# Patient Record
Sex: FEMALE | Race: WHITE | Employment: OTHER | URBAN - METROPOLITAN AREA
[De-identification: names, ages, dates, MRNs, and addresses within clinical notes are randomized per-mention and may not be internally consistent; named-entity substitution may affect disease eponyms.]

---

## 2022-11-17 ENCOUNTER — HOSPITAL ENCOUNTER (OUTPATIENT)
Dept: PHYSICAL THERAPY | Age: 22
Discharge: HOME | End: 2022-11-17
Payer: OTHER GOVERNMENT

## 2022-11-17 PROCEDURE — 97112 NEUROMUSCULAR REEDUCATION: CPT

## 2022-11-17 PROCEDURE — 97162 PT EVAL MOD COMPLEX 30 MIN: CPT

## 2022-11-17 PROCEDURE — 97535 SELF CARE MNGMENT TRAINING: CPT

## 2022-11-17 NOTE — PROGRESS NOTES
Physical Therapy Evaluation        Patient Name: Carol Mojica  Date:2022  : 2000  [x]  Patient  Verified  Payor: MICHAEL / Plan: Deepak Colon 73 / Product Type:  /    In time:11:15  Out time:12:08  Total Treatment Time (min): 53  Visit #: 1 of 12    Medicare/BCBS Only   Total Timed Codes (min):  35 1:1 Treatment Time:  53       Treatment Area: Other low back pain [M54.59]    SUBJECTIVE  ny medication changes, allergies to medications, adverse drug reactions, diagnosis change, or new procedure performed?: [x] No    [] Yes (see summary sheet for update)  Subjective functional status/changes:     Current symptoms/Complaints: SIJ/LBP, hip and thigh pain Right>Left  Mechanism of Injury: Pregnancy (patient is 24 weeks pregnant at [facility])    History of Present Complaint: Patient is 24 weeks pregnant. Patient reports symptoms began in September and are getting worse.     PLOF: functionally independent, no modifications, active lifestyle, works mainly while sitting but consistently up and down  Limitations to PLOF: pain at work- requires more frequency sitting breaks during jobs, taking Tylenol as needed, pain daily that worsens with sit<>stand transfers, pain is a distraction, pain with walking sometimes, pain with bending over to feed cats-bought automatic feeder    Pain Location: right hip to upper thigh, LBP and SIJ, sometimes in lft  Pain Level (0-10 scale):   []constant []intermittent []improving [x]worsening []no change since onset  Current: 4/10  Best: 2-3/10 during rest, first thing in the morning  Worst: 9/10 during nighttime, trying to fall asleep  Sleep: is interrupted secondary to pain    Previous Treatment/Compliance: Tylenol PRN, KT tape for support, ordered maternity support band  Obstetrical History:First pregnancy, no complications  PMHx/Surgical Hx: None relevant  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 [] Yes    [x] No    Bowel function: normal, no problems    Diet: Good    Fluid intake:    Fluid  Amount per day   Water 64 ounces   Caffeine 1-2 soda    Alcohol none             Physical Exam Objective Findings:    Gait:  [] Normal     [x] Abnormal: decreased hip and knee flexion, stiff through trunk, increased lateral sway, \"robotic\"     Postural assessment:  Seated: biased into APT  Pelvic girdle alignment:    Innominate upslip on left    Lumbar Active Movements: Deferred due to pregnancy    Hip ROM: WNL, hip pain and end range IR>ER, Righ>left   Flexion past 90 degrees provokes SIJ symptoms    Strength   L(0-5) R (0-5) N/T   Hip Flexion (L1,2) NT due to pain with SLR  NT due to pain with SLR []   Abdominals  Lower abdominals  Rectus Able to contractTA  []   Gluteus Khris Able to lift into bridge 50% range with pain  []   Hip ER- seated 4 pain 3+ pain []   Hip IR- seated 4 pain 3+ pain []       Special Tests  SLS:  Right: 10 seconds           Left: 10 seconds, pain    ASLR: Active Straight Leg Raise Test   Right: Effortful to raise leg> left, Less pain with external stabilization, + test  Left:  Less pain with external stabilization, + test      [place]:  Stork test:  [] R    [] L    [] +    [x] -     Hip:  Anuel:  [x] R    [x] L    [x] +    [] -     FADIR:  [] R    [] L    [] +    [x] -     Scours: negative    18 min [x]Eval                  []Re-Eval       25 min Self Care: Reviewed expectations for orthopedic SIJ/lumbar spine/ hip PT vs. PFPT and POC.    Re-positioned upslip in left innominate with education on symmetrical postures and transfers to prevent further irritation. Education on pregnancy, hormones and effect on body joints, expectations for pregnancy progression, support belt usage. Rationale:  Increase awareness and understanding of current condition to improve patients ability to independently and effectively attain goals and progress towards long term management of current condition. 10 min Neuro re-ed:  [] See flow sheet : Exercises for SIJ pain/ stability and improving gait mechanics   Rationale: increase strength, improve coordination, and increase proprioception to improve the patients ability to perform ADLs with less pain and restore PLOF          With   [] TE   [] TA   [] neuro   [] other: Patient Education: [x] Review HEP    [] Progressed/Changed HEP based on:   [] positioning   [] body mechanics   [] transfers   [] heat/ice application    [] other:           Pain Level (0-10 scale) post treatment: 4/10    ASSESSMENT/Changes in Function: Patient is a 20year old female who is 24 weeks pregnant with first child presenting to Physical Therapy with c/o SIJ/ lumbar/ hip pain on right>left due to progressing pregnancy which is limiting ability function at previous level. Patient's symptoms began in September and are progressively worsening. Her symptoms limit her functioning including work, transfers, and sleep. Of note, patient presented with left innominate upslip, positive ASLR, and pain/weakness/ pain provocation of hips indicating SIJ instability. Patient presents with decreased strength, coordination, and endurance of muscles and decreased strength of postural stabilizers. Patient will benefit from skilled physical therapy intervention to address pain and deficits with focus on external SIJ/ lumbar spine/ hip orthopedics, with internal Pelvic floor muscle assessment as needed to optimize highest functional level possible.      Patient will continue to benefit from skilled PT services to modify and progress therapeutic interventions, address functional mobility deficits, address ROM deficits, address strength deficits, analyze and address soft tissue restrictions, analyze and cue movement patterns, analyze and modify body mechanics/ergonomics, assess and modify postural abnormalities, address imbalance/dizziness, and instruct in home and community integration to attain remaining goals. [x]  See Plan of Care  []  See progress note/recertification  []  See Discharge Summary         Progress towards goals / Updated goals:  Short Term Goals: To be accomplished in 3 weeks :  Patient will be independent and compliant with HEP to progress toward goals and restore functional mobility. Eval Status: issued at eval    Patient will demonstrate consistent innominate symmetry for less pain during transfers at work. Eval Status: Innominate upslip on left    Patient will improve pain in hip, lumbar spine, and SIJ to some instances of 0/10  to improve walking tolerance and restore prior level of function. Eval Status: Best: 2-3/10 during rest, first thing in the morning    Long Term Goals: To be accomplished in 6 weeks:  Patient will improve FOTO score by 23 points (to 74 points) to improve functional tolerance for working tolerance. Eval Status: FOTO 51  FOTO score = an established functional score where 100 = no disability    2. Pt will have painfree hip AROM to aid in functional mechanics for ambulation/ADLs. Eval Status: Hip ROM: WNL, hip pain and end range IR>ER, Righ>left; Flexion past 90 degrees provokes SIJ symptoms    3. Pt will have 4+/5 hip ER/IR strength to return to goals of exercising through pregnancy without pain. Eval Status:    L(0-5) R (0-5)   Hip ER- seated 4 pain 3+ pain   Hip IR- seated 4 pain 3+ pain       4. Patient will improve pain in hip, lumbar spine, and SIJ  to 2-3/10 at worst to improve work tolerance and quality and restore prior level of function. Eval Status:  Worst: 9/10 during nighttime, trying to fall asleep/10 at worst    5. Pt will have negative and symmetrical ASLR to improve ability to bend over for ADLs. Eval Status: ASLR: Active Straight Leg Raise Test   Right: Effortful to raise leg> left, Less pain with external stabilization, + test  Left:  Less pain with external stabilization, + test    6. Patient will have appropriate knowledge of pelvic floor, TA, and urinary/bowel/pelvic pain symptoms for appropriate self care pre-natally and post-natally for independent management and referral if necessary.    Eval status: patient unaware of normal functioning of pelvic floor, TA, and possibly symptoms on genitourinary systems pre- and post-natally    PLAN  []  Upgrade activities as tolerated     [x]  Continue plan of care  []  Update interventions per flow sheet       []  Discharge due to:_  []  Other:_      Carlos Segura, PT 11/17/2022  9:37 AM       .Pankaj Smiley

## 2022-11-17 NOTE — PROGRESS NOTES
[facility] at the [address], [place], [address] [facility]  Phone: [phone]      Fax:  [phone]      Plan of Care/ Statement of Necessity for Physical Therapy Services      Patient name: Cassandra Copeland Start of Care: 2022   Referral source: Susan Yadav MD : 2000    Medical Diagnosis: Other low back pain [M54.59]   Onset Date:22    Treatment Diagnosis: Other low back pain [M54.59]   Prior Hospitalization: see medical history Provider#: [id number]   Medications: Verified on Patient summary List    Comorbidities: 24 weeks pregnant with first child at [facility]   Prior Level of Function: functionally independent, no modifications, active lifestyle, works mainly while sitting but consistently up and down      The Plan of Care and following information is based on the information from the initial evaluation. Assessment/ key information: Patient is a 20year old female who is 24 weeks pregnant with first child presenting to Physical Therapy with c/o SIJ/ lumbar/ hip pain on right>left due to progressing pregnancy which is limiting ability function at previous level. Patient's symptoms began in September and are progressively worsening. Her symptoms limit her functioning including work, transfers, and sleep. Of note, patient presented with left innominate upslip, positive ASLR, and pain/weakness/ pain provocation of hips indicating SIJ instability. Patient presents with decreased strength, coordination, and endurance of muscles and decreased strength of postural stabilizers. Patient will benefit from skilled physical therapy intervention to address pain and deficits with focus on external SIJ/ lumbar spine/ hip orthopedics, with internal Pelvic floor muscle assessment as needed to optimize highest functional level possible.       Evaluation Complexity History MEDIUM  Complexity : 1-2 comorbidities / personal factors will impact the outcome/ POC ; Examination MEDIUM Complexity : 3 Standardized tests and measures addressing body structure, function, activity limitation and / or participation in recreation  ;Presentation MEDIUM Complexity : Evolving with changing characteristics  ; Clinical Decision Making MEDIUM Complexity : FOTO score of 26-74 FOTO score = an established functional score where 100 = no disability  Overall Complexity Rating: MEDIUM  Problem List: pain affecting function, decrease strength, impaired gait/ balance, decrease ADL/ functional abilitiies, decrease activity tolerance, decrease flexibility/ joint mobility, decrease transfer abilities, and other unknowledgeable of effect of pregnancy on body    Treatment Plan may include any combination of the following: Therapeutic exercise, Neuromuscular reeducation, Manual therapy, Therapeutic activity, Self care/home management, Gait training, and Other: Pelvic Floor Physical Therapy    Patient / Family readiness to learn indicated by: asking questions and interest  Persons(s) to be included in education: patient (P)  Barriers to Learning/Limitations: None  Patient Goal (s): To help relieve the pain and learn new exercises throughout pregnancy  Patient Self Reported Health Status: good  Rehabilitation Potential: good    Short Term Goals: To be accomplished in 3 weeks :  Patient will be independent and compliant with HEP to progress toward goals and restore functional mobility. Eval Status: issued at eval     Patient will demonstrate consistent innominate symmetry for less pain during transfers at work. Eval Status: Innominate upslip on left     Patient will improve pain in hip, lumbar spine, and SIJ to some instances of 0/10  to improve walking tolerance and restore prior level of function. Eval Status: Best: 2-3/10 during rest, first thing in the morning     Long Term Goals:  To be accomplished in 6 weeks:  Patient will improve FOTO score by 23 points (to 74 points) to improve functional tolerance for working tolerance. Eval Status: FOTO 51  FOTO score = an established functional score where 100 = no disability     2. Pt will have painfree hip AROM to aid in functional mechanics for ambulation/ADLs. Eval Status: Hip ROM: WNL, hip pain and end range IR>ER, Righ>left; Flexion past 90 degrees provokes SIJ symptoms     3. Pt will have 4+/5 hip ER/IR strength to return to goals of exercising through pregnancy without pain. Eval Status:     L(0-5) R (0-5)   Hip ER- seated 4 pain 3+ pain   Hip IR- seated 4 pain 3+ pain         4. Patient will improve pain in hip, lumbar spine, and SIJ  to 2-3/10 at worst to improve work tolerance and quality and restore prior level of function. Eval Status: Worst: 9/10 during nighttime, trying to fall asleep/10 at worst     5. Pt will have negative and symmetrical ASLR to improve ability to bend over for ADLs. Eval Status: ASLR: Active Straight Leg Raise Test   Right: Effortful to raise leg> left, Less pain with external stabilization, + test  Left:  Less pain with external stabilization, + test     6. Patient will have appropriate knowledge of pelvic floor, TA, and urinary/bowel/pelvic pain symptoms for appropriate self care pre-natally and post-natally for independent management and referral if necessary. Eval status: patient unaware of normal functioning of pelvic floor, TA, and possibly symptoms on genitourinary systems pre- and post-natally    Frequency / Duration: Patient to be seen 2 times per week for 6 weeks. Patient/ Caregiver education and instruction: Diagnosis, prognosis, self care, activity modification, and exercises   [x]  Plan of care has been reviewed with JAVED Valladares, PT 11/17/2022 9:37 AM  _____________________________________________________________________  I certify that the above Therapy Services are being furnished while the patient is under my care.  I agree with the treatment plan and certify that this therapy is necessary.     Physician's Signature:____________Date:_________TIME:________                                      Ruslan Delgado MD    ** Signature, Date and Time must be completed for valid certification **    Please sign and return to [facility] at the [address], [address] [facility]       Phone: [phone]      Fax:  [phone]

## 2022-11-29 ENCOUNTER — HOSPITAL ENCOUNTER (OUTPATIENT)
Dept: PHYSICAL THERAPY | Age: 22
Discharge: HOME | End: 2022-11-29
Payer: OTHER GOVERNMENT

## 2022-11-29 PROCEDURE — 97110 THERAPEUTIC EXERCISES: CPT

## 2022-11-29 PROCEDURE — 97535 SELF CARE MNGMENT TRAINING: CPT

## 2022-11-29 PROCEDURE — 97112 NEUROMUSCULAR REEDUCATION: CPT

## 2022-11-29 PROCEDURE — 97530 THERAPEUTIC ACTIVITIES: CPT

## 2022-12-09 ENCOUNTER — HOSPITAL ENCOUNTER (OUTPATIENT)
Dept: PHYSICAL THERAPY | Age: 22
Discharge: HOME | End: 2022-12-09
Payer: OTHER GOVERNMENT

## 2022-12-09 PROCEDURE — 97530 THERAPEUTIC ACTIVITIES: CPT

## 2022-12-09 PROCEDURE — 97110 THERAPEUTIC EXERCISES: CPT

## 2022-12-09 PROCEDURE — 97112 NEUROMUSCULAR REEDUCATION: CPT

## 2022-12-09 PROCEDURE — 97535 SELF CARE MNGMENT TRAINING: CPT

## 2022-12-09 NOTE — PROGRESS NOTES
PT DAILY TREATMENT NOTE    Patient Name: Malena Bergeron  Date:2022  : 2000  [x]  Patient  Verified  Payor:  / Plan: Kunal Presser / Product Type:  /    In time:12:04  Out time:12:48am  Total Treatment Time (min): 44  Total Timed Codes (min): 44  1:1 Treatment Time (MC/BCBS only): NA   Visit #: 3 of 12    Treatment Dx: Other low back pain [M54.59]    SUBJECTIVE  Pain Level (0-10 scale): 1  Any medication changes, allergies to medications, adverse drug reactions, diagnosis change, or new procedure performed?: [x] No    [] Yes (see summary sheet for update)  Subjective functional status/changes:   [] No changes reported  Pt reports overall improvements and good HEP compliance. Still has trouble tolerating sleeping in sidelying, especially right side, but maternity pillow helps. Still having intermittent radicular sxs in right posterior leg.      OBJECTIVE       10 min Therapeutic Exercise:  [x] See flow sheet :   Rationale: increase ROM, increase strength, improve coordination, improve balance, and increase proprioception to improve the patients ability to perform daily activities with decreased pain and symptom levels        8 min Therapeutic Activity:  [x]  See flow sheet : verbal review of HEP and discussion of current sxs/complaints   Rationale: increase ROM, increase strength, improve coordination, improve balance, and increase proprioception  to improve the patients ability to perform daily activities with decreased pain and symptom levels        18 min Neuromuscular Re-education:  [x]  See flow sheet : pelvic mobility and hip shifting with tactile, verbal, and visual cueing to reduce compensation at thoracic spine    Rationale: increase ROM, increase strength, improve coordination, improve balance, and increase proprioception  to improve the patients ability to perform daily activities with decreased pain and symptom levels      8 min Self Care:  Edu re: activity modification, chair positioning, chair transfer mechanics, sleep positioning   Rationale:to increase awareness and understanding of current condition to improve patients ability to independently and effectively attain goals and progress towards long term management of current condition. With   [] TE   [] TA   [] neuro   [] other: Patient Education: [x] Review HEP    [] Progressed/Changed HEP based on:   [] positioning   [] body mechanics   [] transfers   [] heat/ice application    [] other:      Other Objective/Functional Measures:    Squat mechanics:   Pre-tx: moderate lateral hip shift to left  Post-tx: minimal lateral hip shift to left     Pain Level (0-10 scale) post treatment: 0    ASSESSMENT/Changes in Function: Observed lateral hip shift to left side with DL squatting, indicating decreased functional right hip flexion and IR; symmetry improved following hip shifting and pelvic mobility activities today and pt voiced reduction in right posterior hip/SIJ pain by end of session. She benefits from tactile cueing at thoracic and lumbar spine to reduce thoracic kyphosis and lumbar lordosis with most activities. Patient will continue to benefit from skilled PT services to modify and progress therapeutic interventions, address functional mobility deficits, address ROM deficits, address strength deficits, analyze and address soft tissue restrictions, analyze and cue movement patterns, analyze and modify body mechanics/ergonomics, assess and modify postural abnormalities, address imbalance/dizziness, and instruct in home and community integration to attain remaining goals. [x]  See Plan of Care  []  See progress note/recertification  []  See Discharge Summary         Progress towards goals / Updated goals:  Short Term Goals: To be accomplished in 3 weeks :  Patient will be independent and compliant with HEP to progress toward goals and restore functional mobility.    Eval Status: issued at eval  Current 11/29/22: patient reports complinace     Patient will demonstrate consistent innominate symmetry for less pain during transfers at work. Eval Status: Innominate upslip on left  Current: 11/29/22: patient demonstrates innominate symmetry today  Status 12/9/22: progressed activities to improve pelvic mobility and symmetry with hip shifting and pelvic mobility exercises     Patient will improve pain in hip, lumbar spine, and SIJ to some instances of 0/10  to improve walking tolerance and restore prior level of function. Eval Status: Best: 2-3/10 during rest, first thing in the morning     Long Term Goals: To be accomplished in 6 weeks:  Patient will improve FOTO score by 23 points (to 74 points) to improve functional tolerance for working tolerance. Eval Status: FOTO 51  FOTO score = an established functional score where 100 = no disability     2. Pt will have painfree hip AROM to aid in functional mechanics for ambulation/ADLs. Eval Status: Hip ROM: WNL, hip pain and end range IR>ER, Righ>left; Flexion past 90 degrees provokes SIJ symptoms     3. Pt will have 4+/5 hip ER/IR strength to return to goals of exercising through pregnancy without pain. Eval Status:     L(0-5) R (0-5)   Hip ER- seated 4 pain 3+ pain   Hip IR- seated 4 pain 3+ pain         4. Patient will improve pain in hip, lumbar spine, and SIJ  to 2-3/10 at worst to improve work tolerance and quality and restore prior level of function. Eval Status: Worst: 9/10 during nighttime, trying to fall asleep/10 at worst  Status 12/9/22: pt reports improvements in pain intensity and overall functional limitation d/t sxs but cont to have difficulty tolerating sidelying sleep positioning      5. Pt will have negative and symmetrical ASLR to improve ability to bend over for ADLs.   Eval Status: ASLR: Active Straight Leg Raise Test   Right: Effortful to raise leg> left, Less pain with external stabilization, + test  Left:  Less pain with external stabilization, + test     6. Patient will have appropriate knowledge of pelvic floor, TA, and urinary/bowel/pelvic pain symptoms for appropriate self care pre-natally and post-natally for independent management and referral if necessary.               Eval status: patient unaware of normal functioning of pelvic floor, TA, and possibly symptoms on genitourinary systems pre- and post-natally      PLAN  []  Upgrade activities as tolerated     [x]  Continue plan of care  []  Update interventions per flow sheet       []  Discharge due to:_  []  Other:_      [facility], PT 12/9/2022  12:04 PM    Future Appointments   Date Time Provider [place]   12/13/2022 10:30 AM YUN Jaquez [facility]   12/15/2022 10:30 AM YUN Jaquez [facility]   12/22/2022 10:30 AM YUN Jaquez [facility]   12/29/2022 10:30 AM YUN Jaquez [facility]

## 2022-12-13 ENCOUNTER — HOSPITAL ENCOUNTER (OUTPATIENT)
Dept: PHYSICAL THERAPY | Age: 22
Discharge: HOME | End: 2022-12-13
Payer: OTHER GOVERNMENT

## 2022-12-13 PROCEDURE — 97530 THERAPEUTIC ACTIVITIES: CPT

## 2022-12-13 PROCEDURE — 97535 SELF CARE MNGMENT TRAINING: CPT

## 2022-12-13 PROCEDURE — 97112 NEUROMUSCULAR REEDUCATION: CPT

## 2022-12-13 PROCEDURE — 97110 THERAPEUTIC EXERCISES: CPT

## 2022-12-13 NOTE — PROGRESS NOTES
PT DAILY TREATMENT NOTE    Patient Name: Carol Mojica  Date:2022  : 2000  [x]  Patient  Verified  Payor:  / Plan: Deepak Colon 74 / Product Type:  /    In time:10:33  Out time:11:15  Total Treatment Time (min): 42  Total Timed Codes (min): 42  1:1 Treatment Time (MC/BCBS only): 42   Visit #: 4 of 12    Treatment Dx: Other low back pain [M54.59]    SUBJECTIVE  Pain Level (0-10 scale): 5  Any medication changes, allergies to medications, adverse drug reactions, diagnosis change, or new procedure performed?: [x] No    [] Yes (see summary sheet for update)  Subjective functional status/changes:   [] No changes reported    Patient reports her HR is increased a little from her norm. Shoulder hurt for a few days but improved. Her right SIJ pain is increasing. Started chiropractics last week with good result.      OBJECTIVE        10 min Therapeutic Exercise:  [] See flow sheet :   Rationale:improve coordination and increase proprioception  to improve the patients ability to perform all ADLs with good body mechanics    10 min Therapeutic Activity:  []  See flow sheet : Pelvic position sitting and standing, performing steps and activities on asymmetrical surfaces   Rationale: improve coordination and increase proprioception  to improve the patients ability to perform all ADLs with good body mechanics        14 min Neuromuscular Re-education:  []  See flow sheet :   Rationale:Rationale: increase strength, improve coordination, and increase proprioception  to improve the patients ability to work activities    8 min Self Care: Symmetry through positions at work and gait to decrease SIJ rotation, innominate correction and self management   Rationale:    improve coordination, increase proprioception, and awareness  to improve the patients ability to participate in all ADLs and 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 Pain improved following manual correction. Completed exercises with cues for symmetry and lumbopelvic stability as able. Continues to demonstrate increased mobility and favoring of left hip/SIJ. Evidence of right hip weakness/muscle inhibition functionally. Added functional pelvic stability training with eccentric step down with improved technique to manage symptoms with VC and TC. Patient will continue to benefit from skilled PT services to modify and progress therapeutic interventions, address functional mobility deficits, address ROM deficits, address strength deficits, analyze and address soft tissue restrictions, analyze and cue movement patterns, analyze and modify body mechanics/ergonomics, assess and modify postural abnormalities, address imbalance/dizziness, and instruct in home and community integration to attain remaining goals. [x]  See Plan of Care  []  See progress note/recertification  []  See Discharge Summary         Progress towards goals / Updated goals:  Short Term Goals: To be accomplished in 3 weeks :  Patient will be independent and compliant with HEP to progress toward goals and restore functional mobility. Eval Status: issued at eval  Current 11/29/22: patient reports complinace     Patient will demonstrate consistent innominate symmetry for less pain during transfers at work.    Eval Status: Innominate upslip on left  Current: 11/29/22: patient demonstrates innominate symmetry today  Status 12/13/22: added eccetric step downs for pelvic symmetry training     Patient will improve pain in hip, lumbar spine, and SIJ to some instances of 0/10  to improve walking tolerance and restore prior level of function. Eval Status: Best: 2-3/10 during rest, first thing in the morning     Long Term Goals: To be accomplished in 6 weeks:  Patient will improve FOTO score by 23 points (to 74 points) to improve functional tolerance for working tolerance. Eval Status: FOTO 51  FOTO score = an established functional score where 100 = no disability     2. Pt will have painfree hip AROM to aid in functional mechanics for ambulation/ADLs. Eval Status: Hip ROM: WNL, hip pain and end range IR>ER, Righ>left; Flexion past 90 degrees provokes SIJ symptoms     3. Pt will have 4+/5 hip ER/IR strength to return to goals of exercising through pregnancy without pain. Eval Status:     L(0-5) R (0-5)   Hip ER- seated 4 pain 3+ pain   Hip IR- seated 4 pain 3+ pain         4. Patient will improve pain in hip, lumbar spine, and SIJ  to 2-3/10 at worst to improve work tolerance and quality and restore prior level of function. Eval Status: Worst: 9/10 during nighttime, trying to fall asleep/10 at worst  Status 12/9/22: pt reports improvements in pain intensity and overall functional limitation d/t sxs but cont to have difficulty tolerating sidelying sleep positioning      5. Pt will have negative and symmetrical ASLR to improve ability to bend over for ADLs. Eval Status: ASLR: Active Straight Leg Raise Test   Right: Effortful to raise leg> left, Less pain with external stabilization, + test  Left:  Less pain with external stabilization, + test     6. Patient will have appropriate knowledge of pelvic floor, TA, and urinary/bowel/pelvic pain symptoms for appropriate self care pre-natally and post-natally for independent management and referral if necessary.               Eval status: patient unaware of normal functioning of pelvic floor, TA, and possibly symptoms on genitourinary systems pre- and post-natally       PLAN  []  Upgrade activities as tolerated [x]  Continue plan of care  []  Update interventions per flow sheet       []  Discharge due to:_  []  Other:_      Dewey Avalos, PT 12/13/2022  8:06 AM    Future Appointments   Date Time Provider [place]o Deal   12/13/2022 10:30 AM YUN Anders [facility]   12/15/2022 10:30 AM YUN Anders [facility]   12/22/2022 10:30 AM YUN Anders [facility]   12/29/2022 10:30 AM YUN Anders [facility] Detail Level: Detailed General Sunscreen Counseling: I recommended a broad spectrum sunscreen with a SPF of 30 or higher.  I explained that SPF 30 sunscreens block approximately 97 percent of the sun's harmful rays.  Sunscreens should be applied at least 15 minutes prior to expected sun exposure and then every 2 hours after that as long as sun exposure continues. If swimming or exercising sunscreen should be reapplied every 45 minutes to an hour after getting wet or sweating.  One ounce, or the equivalent of a shot glass full of sunscreen, is adequate to protect the skin not covered by a bathing suit. I also recommended a lip balm with a sunscreen as well. Sun protective clothing can be used in lieu of sunscreen but must be worn the entire time you are exposed to the sun's rays.

## 2022-12-15 ENCOUNTER — HOSPITAL ENCOUNTER (OUTPATIENT)
Dept: PHYSICAL THERAPY | Age: 22
Discharge: HOME | End: 2022-12-15
Payer: OTHER GOVERNMENT

## 2022-12-15 PROCEDURE — 97535 SELF CARE MNGMENT TRAINING: CPT

## 2022-12-15 PROCEDURE — 97530 THERAPEUTIC ACTIVITIES: CPT

## 2022-12-15 PROCEDURE — 97112 NEUROMUSCULAR REEDUCATION: CPT

## 2022-12-15 PROCEDURE — 97110 THERAPEUTIC EXERCISES: CPT

## 2022-12-15 NOTE — PROGRESS NOTES
[facility] at the [address], [place], [address] [facility]  Phone: [phone]      Fax:  [phone]    Progress Note  Patient name: Femi Boggs Start of Care: 2022   Referral source: Trinity Villatoro MD : 2000               Medical Diagnosis: Other low back pain [M54.59]    Onset Date:22               Treatment Diagnosis: Other low back pain [M54.59]   Prior Hospitalization: see medical history Provider#: [id number]   Medications: Verified on Patient summary List    Comorbidities: 24 weeks pregnant with first child at [facility]   Prior Level of Function: functionally independent, no modifications, active lifestyle, works mainly while sitting but consistently up and down                              Visits from Start of Care: 5    Missed Visits: 0    Progress Towards Goals:  Patient is a 20year old female who is 28 weeks pregnant with first child presenting to Physical Therapy with c/o SIJ/ lumbar/ hip pain on right>left due to progressing pregnancy which is limiting ability function at previous level. Patient has completed 5 Physical Therapy visits. Patient reports significant decrease in pain frequency and duration, with decreased overall levels of pain. Her hip strength and ROM is improved since evaluation. Patient demonstrates improvement in body mechanics and awareness to manage her symptoms. Patient continues to demonstrate deficits in right hip strength and subsequently still requires SIJ corrections to alignment. She continues to have right hip and SIJ pain even though is it improving, warratngin . Patient is making progress towards goals. Patient will benefit from continued skilled Physical Therapy intervention to address remaining deficits and achieve goals to maximize function. Updated Goals: to be achieved in 6 weeks   Short Term Goals:  To be accomplished in 3 weeks :  Patient will be independent and compliant with HEP to progress toward goals and restore functional mobility. Eval Status: issued at eval  Current 12/15/22: patient reports compliance with HEP and application of body positioning      Patient will demonstrate consistent innominate symmetry for less pain during transfers at work. Eval Status: Innominate upslip on left  Current: 11/29/22: patient demonstrates innominate symmetry today  Status 12/15/22: patient presents in alignment today followin correction on 12/13/22. Patient educated and performing HEP to improve symmetry     Patient will improve pain in hip, lumbar spine, and SIJ to some instances of 0/10  to improve walking tolerance and restore prior level of function. Eval Status: Best: 2-3/10 during rest, first thing in the morning  Current: 12/15/22: 1-2/10 at best     Long Term Goals: To be accomplished in 6 weeks:  Patient will improve FOTO score by 23 points (to 74 points) to improve functional tolerance for working tolerance. Eval Status: FOTO 51  FOTO score = an established functional score where 100 = no disability  Current: 12/15/22: to be assessed at next visit due to time constraints     2. Pt will have painfree hip AROM to aid in functional mechanics for ambulation/ADLs. Eval Status: Hip ROM: WNL, hip pain and end range IR>ER, Righ>left; Flexion past 90 degrees provokes SIJ symptoms  Current: MET12/15/22: Patinet demonstrates full hip flexion ROM within allowance of pregrnancy and full hip IR/ER without discomfort. Reports no functional limitations     3. Pt will have 4+/5 hip ER/IR strength to return to goals of exercising through pregnancy without pain. Eval Status:     L(0-5) R (0-5)   Hip ER- seated 4 pain 3+ pain   Hip IR- seated 4 pain 3+ pain    Current: 12/15/22:        L(0-5) R (0-5)   Hip ER- seated 4+ 4-   Hip IR- seated 4+ 4-         4.    Patient will improve pain in hip, lumbar spine, and SIJ  to 2-3/10 at worst to improve work tolerance and quality and restore prior level of function. Eval Status: Worst: 9/10 during nighttime, trying to fall asleep/10 at worst  Status 12/15/22: 8/10 at worst at night, less frequent (only 1-2 times per week vs. 4-5 times at evaluation) and less duration     5. Pt will have negative and symmetrical ASLR to improve ability to bend over for ADLs. Eval Status: ASLR: Active Straight Leg Raise Test   Right: Effortful to raise leg> left, Less pain with external stabilization, + test  Left:  Less pain with external stabilization, + test  Current: MET 12/15/22: same bilaterally with and without external support      6. Patient will have appropriate knowledge of pelvic floor, TA, and urinary/bowel/pelvic pain symptoms for appropriate self care pre-natally and post-natally for independent management and referral if necessary. Eval status: patient unaware of normal functioning of pelvic floor, TA, and possibly symptoms on genitourinary systems pre- and post-natally              Current: 12/15/22: patient with increased awareness and improved body positioning application for symptom management       ASSESSMENT/RECOMMENDATIONS:  [x]Continue therapy per initial plan/protocol at a frequency of  2 x per week for 6 weeks    Thank you for this referral.   Zehra Durán, PT 12/15/2022 8:05 AM  NOTE TO PHYSICIAN:  Kay Mckoy [address]   FAX TO [facility] Physical Therapy: [phone]  If you are unable to process this request in 24 hours please contact our office: [phone]        []  I have read the above report and request that my patient continue as recommended. []  I have read the above report and request that my patient continue therapy with the following changes/special instructions:________________________________________  []I have read the above report and request that my patient be discharged from therapy.     Physician's Signature:____________Date:_________TIME:________                                      Jd Johnson MD      ** Signature, Date and Time must be completed for valid certification **

## 2022-12-15 NOTE — PROGRESS NOTES
PT DAILY TREATMENT NOTE    Patient Name: Nicol Lew  Date:12/15/2022  : 2000  [x]  Patient  Verified  Payor: MICHAEL / Plan: Deepak Colon 74 / Product Type:  /    In time:10:30  Out time:11:20  Total Treatment Time (min): 50  Total Timed Codes (min): 50  1:1 Treatment Time (MC/BCBS only): 50   Visit #: 5 of 12    Treatment Dx: Other low back pain [M54.59]    SUBJECTIVE  Pain Level (0-10 scale): 1-210  Any medication changes, allergies to medications, adverse drug reactions, diagnosis change, or new procedure performed?: [x] No    [] Yes (see summary sheet for update)  Subjective functional status/changes:   [] No changes reported    Patient reports low grade of discomfort in hip since previous session. Patient reports significant improvement and pain alleviation since beginning therapy.      OBJECTIVE  16 min Therapeutic Exercise:  [] See flow sheet :   Rationale:improve coordination and increase proprioception  to improve the patients ability to perform all ADLs with good body mechanics     10 min Therapeutic Activity:  []  See flow sheet : Pelvic position sitting and standing, performing steps and activities on asymmetrical surfaces   Rationale: improve coordination and increase proprioception  to improve the patients ability to perform all ADLs with good body mechanics        14 min Neuromuscular Re-education:  []  See flow sheet :   Rationale:Rationale: increase strength, improve coordination, and increase proprioception  to improve the patients ability to work activities     10 min Self Care: Education on exercises and travelling recommendations to manage symptoms during place ride to [place]   Rationale:    improve coordination, increase proprioception, and awareness  to improve the patients ability to participate in all ADLs and 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 Patient has completed 5 Physical Therapy visits. Patient reports significant decrease in pain frequency and duration, with decreased overall levels of pain. Her hip strength and ROM is improved since evaluation. Patient demonstrates improvement in body mechanics and awareness to manage her symptoms. Patient continues to demonstrate deficits in right hip strength and subsequently still requires SIJ corrections to alignment. She continues to have right hip and SIJ pain even though is it improving, warratngin . Patient is making progress towards goals. Patient will benefit from continued skilled Physical Therapy intervention to address remaining deficits and achieve goals to maximize function. Patient will continue to benefit from skilled PT services to modify and progress therapeutic interventions, address functional mobility deficits, address ROM deficits, address strength deficits, analyze and address soft tissue restrictions, analyze and cue movement patterns, analyze and modify body mechanics/ergonomics, assess and modify postural abnormalities, address imbalance/dizziness, and instruct in home and community integration to attain remaining goals. [x]  See Plan of Care  []  See progress note/recertification  []  See Discharge Summary         Progress towards goals / Updated goals:  Short Term Goals:  To be accomplished in 3 weeks :  Patient will be independent and compliant with HEP to progress toward goals and restore functional mobility. Eval Status: issued at eval  Current 12/15/22: patient reports compliance with HEP and application of body positioning      Patient will demonstrate consistent innominate symmetry for less pain during transfers at work. Eval Status: Innominate upslip on left  Current: 11/29/22: patient demonstrates innominate symmetry today  Status 12/15/22: patient presents in alignment today followin correction on 12/13/22. Patient educated and performing HEP to improve symmetry     Patient will improve pain in hip, lumbar spine, and SIJ to some instances of 0/10  to improve walking tolerance and restore prior level of function. Eval Status: Best: 2-3/10 during rest, first thing in the morning  Current: 12/15/22: 1-2/10 at best     Long Term Goals: To be accomplished in 6 weeks:  Patient will improve FOTO score by 23 points (to 74 points) to improve functional tolerance for working tolerance. Eval Status: FOTO 51  FOTO score = an established functional score where 100 = no disability  Current: 12/15/22: to be assessed at next visit due to time constraints     2. Pt will have painfree hip AROM to aid in functional mechanics for ambulation/ADLs. Eval Status: Hip ROM: WNL, hip pain and end range IR>ER, Righ>left; Flexion past 90 degrees provokes SIJ symptoms  Current: MET12/15/22: Patinet demonstrates full hip flexion ROM within allowance of pregrnancy and full hip IR/ER without discomfort. Reports no functional limitations     3. Pt will have 4+/5 hip ER/IR strength to return to goals of exercising through pregnancy without pain. Eval Status:     L(0-5) R (0-5)   Hip ER- seated 4 pain 3+ pain   Hip IR- seated 4 pain 3+ pain    Current: 12/15/22:       L(0-5) R (0-5)   Hip ER- seated 4+ 4-   Hip IR- seated 4+ 4-        4. Patient will improve pain in hip, lumbar spine, and SIJ  to 2-3/10 at worst to improve work tolerance and quality and restore prior level of function. Eval Status:  Worst: 9/10 during nighttime, trying to fall asleep/10 at worst  Status 12/15/22: 8/10 at worst at night, less frequent (only 1-2 times per week vs. 4-5 times at evaluation) and less duration     5. Pt will have negative and symmetrical ASLR to improve ability to bend over for ADLs. Eval Status: ASLR: Active Straight Leg Raise Test   Right: Effortful to raise leg> left, Less pain with external stabilization, + test  Left:  Less pain with external stabilization, + test  Current: MET 12/15/22: same bilaterally with and without external support      6. Patient will have appropriate knowledge of pelvic floor, TA, and urinary/bowel/pelvic pain symptoms for appropriate self care pre-natally and post-natally for independent management and referral if necessary.               Eval status: patient unaware of normal functioning of pelvic floor, TA, and possibly symptoms on genitourinary systems pre- and post-natally   Current: 12/15/22: patient with increased awareness and improved body positioning application for symptom management            PLAN  []  Upgrade activities as tolerated     [x]  Continue plan of care  []  Update interventions per flow sheet       []  Discharge due to:_  []  Other:_      Peyton Dela Cruz, PT 12/15/2022  8:04 AM    Future Appointments   Date Time Provider [place] Deal   12/15/2022 10:30 AM YUN Jaquez [facility]   12/22/2022 10:30 AM YUN Jaquez [facility]   12/29/2022 10:30 AM YUN Jaquez [facility]

## 2022-12-22 ENCOUNTER — APPOINTMENT (OUTPATIENT)
Dept: PHYSICAL THERAPY | Age: 22
End: 2022-12-22
Payer: OTHER GOVERNMENT

## 2022-12-29 ENCOUNTER — HOSPITAL ENCOUNTER (OUTPATIENT)
Dept: PHYSICAL THERAPY | Age: 22
End: 2022-12-29
Payer: OTHER GOVERNMENT

## 2022-12-29 PROCEDURE — 97530 THERAPEUTIC ACTIVITIES: CPT

## 2022-12-29 PROCEDURE — 97110 THERAPEUTIC EXERCISES: CPT

## 2022-12-29 PROCEDURE — 97535 SELF CARE MNGMENT TRAINING: CPT

## 2022-12-29 PROCEDURE — 97112 NEUROMUSCULAR REEDUCATION: CPT

## 2022-12-29 NOTE — PROGRESS NOTES
PT DAILY TREATMENT NOTE    Patient Name: Teagan Burnett  Date:2022  : 2000  [x]  Patient  Verified  Payor:  / Plan: Deepak Colon 74 / Product Type:  /    In time:12:10pm  Out time:12:50pm  Total Treatment Time (min): 40  Total Timed Codes (min): 40  1:1 Treatment Time (MC/BCBS only): NA   Visit #: 6 of 12    Treatment Dx: Other low back pain [M54.59]    SUBJECTIVE  Pain Level (0-10 scale): 1-2  Any medication changes, allergies to medications, adverse drug reactions, diagnosis change, or new procedure performed?: [x] No    [] Yes (see summary sheet for update)  Subjective functional status/changes:   [] No changes reported  Pt reports that she tested positive for gestational diabetes and is starting medication for this next month. Pain is less in hip and more right abdominal pain. She is sleeping better.  Remaining pain is more generalized    OBJECTIVE       10 min Therapeutic Exercise:  [x] See flow sheet :   Rationale: increase ROM, increase strength, improve coordination, improve balance, and increase proprioception to improve the patients ability to perform daily activities with decreased pain and symptom levels     12 min Therapeutic Activity:  [x]  See flow sheet : stair negotiation activities; discussion of current sxs sand progress made   Rationale: increase ROM, increase strength, improve coordination, improve balance, and increase proprioception  to improve the patients ability to perform daily activities with decreased pain and symptom levels     10 min Neuromuscular Re-education:  [x]  See flow sheet : tactile, verbal, and visual cueing to reduce compensation for hip rotation at thorax with stair activites, seated hip shifting   Rationale: increase ROM, increase strength, improve coordination, improve balance, and increase proprioception  to improve the patients ability to perform daily activities with decreased pain and symptom levels    8 min Self Care:  Edu re: HEP, pelvic support garments for pregnancy, discussion re: gestational diabetese diagnosis    Rationale:to increase awareness and understanding of current condition to improve patients ability to independently and effectively attain goals and progress towards long term management of current condition. With   [] TE   [x] TA   [] neuro   [] other: Patient Education: [x] Review HEP    [] Progressed/Changed HEP based on:   [] positioning   [] body mechanics   [] transfers   [] heat/ice application    [] other:      Other Objective/Functional Measures: FOTO: 72 (21 point improvement)    Pain Level (0-10 scale) post treatment: 1    ASSESSMENT/Changes in Function: Pt reports dx of gestational DM last week and is going to start medication for this soon; no changes in status during todays session. Provided tactile cueing at lateral LE for reducing medial columnar collapse on right LE with stair and gait training activities. Cueing to reduce compensation for hip and pelvic girdle movement at thorax with seated hip mobility and strengthening. Overall patient is progressing well. Patient will continue to benefit from skilled PT services to modify and progress therapeutic interventions, address functional mobility deficits, address ROM deficits, address strength deficits, analyze and address soft tissue restrictions, analyze and cue movement patterns, analyze and modify body mechanics/ergonomics, assess and modify postural abnormalities, address imbalance/dizziness, and instruct in home and community integration to attain remaining goals. [x]  See Plan of Care  []  See progress note/recertification  []  See Discharge Summary         Progress towards goals / Updated goals:  Short Term Goals: To be accomplished in 3 weeks :  Patient will be independent and compliant with HEP to progress toward goals and restore functional mobility.    [facility] Status: issued at [facility]  Current 12/15/22: patient reports compliance with HEP and application of body positioning      Patient will demonstrate consistent innominate symmetry for less pain during transfers at work. Eval Status: Innominate upslip on left  Current: 11/29/22: patient demonstrates innominate symmetry today  Status 12/15/22: patient presents in alignment today followin correction on 12/13/22. Patient educated and performing HEP to improve symmetry     Patient will improve pain in hip, lumbar spine, and SIJ to some instances of 0/10  to improve walking tolerance and restore prior level of function. Eval Status: Best: 2-3/10 during rest, first thing in the morning  Current: 12/15/22: 1-2/10 at best     Long Term Goals: To be accomplished in 6 weeks:  Patient will improve FOTO score by 23 points (to 74 points) to improve functional tolerance for working tolerance. Eval Status: FOTO 51  Status 12/29/22: 72 progressing  FOTO score = an established functional score where 100 = no disability  Current: 12/15/22: to be assessed at next visit due to time constraints     2. Pt will have painfree hip AROM to aid in functional mechanics for ambulation/ADLs. Eval Status: Hip ROM: WNL, hip pain and end range IR>ER, Righ>left; Flexion past 90 degrees provokes SIJ symptoms  Current: MET12/15/22: Lainenet demonstrates full hip flexion ROM within allowance of pregrnancy and full hip IR/ER without discomfort. Reports no functional limitations     3. Pt will have 4+/5 hip ER/IR strength to return to goals of exercising through pregnancy without pain. Eval Status:     L(0-5) R (0-5)   Hip ER- seated 4 pain 3+ pain   Hip IR- seated 4 pain 3+ pain    Current: 12/15/22:        L(0-5) R (0-5)   Hip ER- seated 4+ 4-   Hip IR- seated 4+ 4-         4. Patient will improve pain in hip, lumbar spine, and SIJ  to 2-3/10 at worst to improve work tolerance and quality and restore prior level of function. Eval Status:  Worst: 9/10 during nighttime, trying to fall asleep/10 at worst  Status 12/15/22: 8/10 at worst at night, less frequent (only 1-2 times per week vs. 4-5 times at evaluation) and less duration     5. Pt will have negative and symmetrical ASLR to improve ability to bend over for ADLs. Eval Status: ASLR: Active Straight Leg Raise Test   Right: Effortful to raise leg> left, Less pain with external stabilization, + test  Left:  Less pain with external stabilization, + test  Current: MET 12/15/22: same bilaterally with and without external support      6. Patient will have appropriate knowledge of pelvic floor, TA, and urinary/bowel/pelvic pain symptoms for appropriate self care pre-natally and post-natally for independent management and referral if necessary.               Eval status: patient unaware of normal functioning of pelvic floor, TA, and possibly symptoms on genitourinary systems pre- and post-natally              Current: 12/15/22: patient with increased awareness and improved body positioning application for symptom management      PLAN  []  Upgrade activities as tolerated     [x]  Continue plan of care  []  Update interventions per flow sheet       []  Discharge due to:_  []  Other:_      Herchel Schilder, PT 12/29/2022  12:02 PM    Future Appointments   Date Time Provider Angelo Deal   1/4/2023  2:00 PM Burnice Josh, PT [facility]   1/6/2023  1:00 PM Burnice Josh, PT [facility]   1/10/2023 11:00 AM Burnice Josh, PT [facility]   1/12/2023 12:00 PM Burnice Josh, PT [facility]   1/17/2023 11:00 AM Burnice Josh, PT [facility]   1/19/2023 10:00 AM Burnice Josh, PT [facility]

## 2023-01-04 ENCOUNTER — HOSPITAL ENCOUNTER (OUTPATIENT)
Dept: PHYSICAL THERAPY | Age: 23
Discharge: HOME | End: 2023-01-04
Payer: OTHER GOVERNMENT

## 2023-01-04 PROCEDURE — 97530 THERAPEUTIC ACTIVITIES: CPT

## 2023-01-04 PROCEDURE — 97112 NEUROMUSCULAR REEDUCATION: CPT

## 2023-01-04 PROCEDURE — 97110 THERAPEUTIC EXERCISES: CPT

## 2023-01-04 NOTE — PROGRESS NOTES
PT DAILY TREATMENT NOTE    Patient Name: Erin Casey  Date:2023  : 2000  [x]  Patient  Verified  Payor:  / Plan: Deepak Colon 74 / Product Type:  /    In time:2:05pm  Out time:2:46pm  Total Treatment Time (min): 41  Total Timed Codes (min): 41  1:1 Treatment Time (MC/BCBS only): NA   Visit #: 7 of 12    Treatment Dx: Other low back pain [M54.59]    SUBJECTIVE  Pain Level (0-10 scale): 3-4  Any medication changes, allergies to medications, adverse drug reactions, diagnosis change, or new procedure performed?: [x] No    [] Yes (see summary sheet for update)  Subjective functional status/changes:   [] No changes reported  Pt reports more back pain and less abdominal pain today.  States she is sleeping alright    OBJECTIVE    23 min Therapeutic Exercise:  [x] See flow sheet :   Rationale: increase ROM, increase strength, improve coordination, improve balance, and increase proprioception to improve the patients ability to perform daily activities with decreased pain and symptom levels     10 min Therapeutic Activity:  [x]  See flow sheet : supported squats with heel lift for improving pelvic positioning with picking up objects, chair transfers   Rationale: increase ROM, increase strength, improve coordination, improve balance, and increase proprioception  to improve the patients ability to perform daily activities with decreased pain and symptom levels     9 min Neuromuscular Re-education:  [x]  See flow sheet : tactile and verbal cueing to improve FAIR bilat with stretching and side step ups   Rationale: increase ROM, increase strength, improve coordination, improve balance, and increase proprioception  to improve the patients ability to perform daily activities with decreased pain and symptom levels          With   [] TE   [] TA   [] neuro   [] other: Patient Education: [x] Review HEP    [] Progressed/Changed HEP based on:   [] positioning   [] body mechanics   [] transfers [] heat/ice application    [] other:      Other Objective/Functional Measures: pt demos lumbar paraspinal hypertonicity bilat. Right posterior hip tightness     Pain Level (0-10 scale) post treatment: 3    ASSESSMENT/Changes in Function: Progressed to upright exercises with focus on maintenance of neutral pelvic position during bilateral and unilateral movements for decreased strain on pelvic girdle with ADLs and work related tasks. Cues for neutral lumbar spine with heels elevated TRX squats and for maintenance of neutral hip rotation with lateral step downs in order to improve lumbo pelvic stability and mobility with upright activity. Patient will continue to benefit from skilled PT services to modify and progress therapeutic interventions, address functional mobility deficits, address ROM deficits, address strength deficits, analyze and address soft tissue restrictions, analyze and cue movement patterns, analyze and modify body mechanics/ergonomics, assess and modify postural abnormalities, address imbalance/dizziness, and instruct in home and community integration to attain remaining goals. [x]  See Plan of Care  []  See progress note/recertification  []  See Discharge Summary         Progress towards goals / Updated goals:  Short Term Goals: To be accomplished in 3 weeks :  Patient will be independent and compliant with HEP to progress toward goals and restore functional mobility. Eval Status: issued at eval  Current 12/15/22: patient reports compliance with HEP and application of body positioning      Patient will demonstrate consistent innominate symmetry for less pain during transfers at work. Eval Status: Innominate upslip on left  Current: 11/29/22: patient demonstrates innominate symmetry today  Status 12/15/22: patient presents in alignment today followin correction on 12/13/22.  Patient educated and performing HEP to improve symmetry     Patient will improve pain in hip, lumbar spine, and SIJ to some instances of 0/10  to improve walking tolerance and restore prior level of function. Eval Status: Best: 2-3/10 during rest, first thing in the morning  Current: 12/15/22: 1-2/10 at best     Long Term Goals: To be accomplished in 6 weeks:  Patient will improve FOTO score by 23 points (to 74 points) to improve functional tolerance for working tolerance. Eval Status: FOTO 51  Status 12/29/22: 72 progressing  FOTO score = an established functional score where 100 = no disability  Current: 12/15/22: to be assessed at next visit due to time constraints     2. Pt will have painfree hip AROM to aid in functional mechanics for ambulation/ADLs. Eval Status: Hip ROM: WNL, hip pain and end range IR>ER, Righ>left; Flexion past 90 degrees provokes SIJ symptoms  Current: MET12/15/22: Patinet demonstrates full hip flexion ROM within allowance of pregrnancy and full hip IR/ER without discomfort. Reports no functional limitations     3. Pt will have 4+/5 hip ER/IR strength to return to goals of exercising through pregnancy without pain. Eval Status:     L(0-5) R (0-5)   Hip ER- seated 4 pain 3+ pain   Hip IR- seated 4 pain 3+ pain    Current: 12/15/22:        L(0-5) R (0-5)   Hip ER- seated 4+ 4-   Hip IR- seated 4+ 4-         4. Patient will improve pain in hip, lumbar spine, and SIJ  to 2-3/10 at worst to improve work tolerance and quality and restore prior level of function. Eval Status: Worst: 9/10 during nighttime, trying to fall asleep/10 at worst  Status 12/15/22: 8/10 at worst at night, less frequent (only 1-2 times per week vs. 4-5 times at evaluation) and less duration  Status 1/4/23:  reports 3-4/10 pain today and overall improvements in pain intensity and sleep     5. Pt will have negative and symmetrical ASLR to improve ability to bend over for ADLs.   Eval Status: ASLR: Active Straight Leg Raise Test   Right: Effortful to raise leg> left, Less pain with external stabilization, + test  Left:  Less pain with external stabilization, + test  Current: MET 12/15/22: same bilaterally with and without external support      6. Patient will have appropriate knowledge of pelvic floor, TA, and urinary/bowel/pelvic pain symptoms for appropriate self care pre-natally and post-natally for independent management and referral if necessary.               Eval status: patient unaware of normal functioning of pelvic floor, TA, and possibly symptoms on genitourinary systems pre- and post-natally              Current: 12/15/22: patient with increased awareness and improved body positioning application for symptom management         PLAN  []  Upgrade activities as tolerated     [x]  Continue plan of care  []  Update interventions per flow sheet       []  Discharge due to:_  []  Other:_      Gabriella Owens PT 1/4/2023  1:07 PM    Future Appointments   Date Time Provider Angelo Deal   1/4/2023  2:00 PM Calvin Enfield, PT [facility]   1/6/2023  1:00 PM Calvin Enfield, PT [facility]   1/10/2023 11:00 AM Calvin Santana, PT [facility]   1/12/2023 12:00 PM Calvin Santana, PT [facility]   1/17/2023 11:00 AM Calvin Santana, PT [facility]   1/19/2023 10:00 AM Calvin Enfield, PT [facility]

## 2023-01-06 ENCOUNTER — HOSPITAL ENCOUNTER (OUTPATIENT)
Dept: PHYSICAL THERAPY | Age: 23
Discharge: HOME | End: 2023-01-06
Payer: OTHER GOVERNMENT

## 2023-01-06 PROCEDURE — 97530 THERAPEUTIC ACTIVITIES: CPT

## 2023-01-06 PROCEDURE — 97110 THERAPEUTIC EXERCISES: CPT

## 2023-01-06 PROCEDURE — 97112 NEUROMUSCULAR REEDUCATION: CPT

## 2023-01-06 NOTE — PROGRESS NOTES
PT DAILY TREATMENT NOTE    Patient Name: Femi Boggs  Date:2023  : 2000  [x]  Patient  Verified  Payor:  / Plan: Deepak Colon 74 / Product Type:  /    In time:1:09pm  Out time:1:55pm  Total Treatment Time (min): 46  Total Timed Codes (min): 46  1:1 Treatment Time (MC/BCBS only): NA   Visit #: 8 of 12    Treatment Dx:  Other low back pain [M54.59]    SUBJECTIVE  Pain Level (0-10 scale): 0  Any medication changes, allergies to medications, adverse drug reactions, diagnosis change, or new procedure performed?: [x] No    [] Yes (see summary sheet for update)  Subjective functional status/changes:   [] No changes reported  Pt states that she was sore after last session but is feeling pretty good today    OBJECTIVE     10 min Therapeutic Exercise:  [x] See flow sheet :   Rationale: increase ROM, increase strength, improve coordination, improve balance, and increase proprioception to improve the patients ability to perform daily activities with decreased pain and symptom levels        12 min Therapeutic Activity:  [x]  See flow sheet : squat mechanics and lateral step downs   Rationale: increase ROM, increase strength, improve coordination, improve balance, and increase proprioception  to improve the patients ability to perform daily activities with decreased pain and symptom levels        24 min Neuromuscular Re-education:  [x]  See flow sheet : activities to improve posterior hip shift and FAIR bilat, increased cueing required to reduce compensation and improve eccentric control on right   Rationale: increase ROM, increase strength, improve coordination, improve balance, and increase proprioception  to improve the patients ability to perform daily activities with decreased pain and symptom levels      With   [] TE   [x] TA   [] neuro   [] other: Patient Education: [x] Review HEP    [] Progressed/Changed HEP based on:   [] positioning   [] body mechanics   [] transfers   [] heat/ice application    [x] other: suspected factors implicated in pt report of hx of right knee pain limiting running tolerance and application of HEP from current POC to return to running post partum      Other Objective/Functional Measures: Demos dec mobility with right posterior hip shift (vs left) and right lumbar side bend compensation; corrected with cueing. Demos medial columnar collapse on right with SL loading and impaired eccentric control     Pain Level (0-10 scale) post treatment: 0    ASSESSMENT/Changes in Function: Progressed activities to improve posterior lumbopelvic mobility and postural strength using lateral lunges with cross reach and tactile cueing to reduce compensation. Started on floor and progressed to lateral step downs from 4'' step on left LE and from 2'' step on right. Demos dec mobility with right posterior hip shift (vs left) and right lumbar side bend compensation; corrected with cueing. Demos medial columnar collapse on right with SL loading and impaired eccentric control. Progressed HEP for right medial HS strengthening to improve lumbopelvic posture and right hip mobility     Patient will continue to benefit from skilled PT services to modify and progress therapeutic interventions, address functional mobility deficits, address ROM deficits, address strength deficits, analyze and address soft tissue restrictions, analyze and cue movement patterns, analyze and modify body mechanics/ergonomics, assess and modify postural abnormalities, address imbalance/dizziness, and instruct in home and community integration to attain remaining goals. [x]  See Plan of Care  []  See progress note/recertification  []  See Discharge Summary         Progress towards goals / Updated goals:  Short Term Goals: To be accomplished in 3 weeks :  Patient will be independent and compliant with HEP to progress toward goals and restore functional mobility.    Eval Status: issued at al  Current 12/15/22: patient reports compliance with HEP and application of body positioning      Patient will demonstrate consistent innominate symmetry for less pain during transfers at work. Eval Status: Innominate upslip on left  Current: 11/29/22: patient demonstrates innominate symmetry today  Status 12/15/22: patient presents in alignment today followin correction on 12/13/22. Patient educated and performing HEP to improve symmetry     Patient will improve pain in hip, lumbar spine, and SIJ to some instances of 0/10  to improve walking tolerance and restore prior level of function. Eval Status: Best: 2-3/10 during rest, first thing in the morning  Current: 12/15/22: 1-2/10 at best     Long Term Goals: To be accomplished in 6 weeks:  Patient will improve FOTO score by 23 points (to 74 points) to improve functional tolerance for working tolerance. Eval Status: FOTO 51  Status 12/29/22: 72 progressing  FOTO score = an established functional score where 100 = no disability  Current: 12/15/22: to be assessed at next visit due to time constraints     2. Pt will have painfree hip AROM to aid in functional mechanics for ambulation/ADLs. Eval Status: Hip ROM: WNL, hip pain and end range IR>ER, Righ>left; Flexion past 90 degrees provokes SIJ symptoms  Current: MET12/15/22: Lainenet demonstrates full hip flexion ROM within allowance of pregrnancy and full hip IR/ER without discomfort. Reports no functional limitations     3. Pt will have 4+/5 hip ER/IR strength to return to goals of exercising through pregnancy without pain. Eval Status:     L(0-5) R (0-5)   Hip ER- seated 4 pain 3+ pain   Hip IR- seated 4 pain 3+ pain    Current: 12/15/22:        L(0-5) R (0-5)   Hip ER- seated 4+ 4-   Hip IR- seated 4+ 4-         4. Patient will improve pain in hip, lumbar spine, and SIJ  to 2-3/10 at worst to improve work tolerance and quality and restore prior level of function. Eval Status:  Worst: 9/10 during nighttime, trying to fall asleep/10 at worst  Status 12/15/22: 8/10 at worst at night, less frequent (only 1-2 times per week vs. 4-5 times at evaluation) and less duration  Status 1/4/23:  reports 3-4/10 pain today and overall improvements in pain intensity and sleep     5. Pt will have negative and symmetrical ASLR to improve ability to bend over for ADLs. Eval Status: ASLR: Active Straight Leg Raise Test   Right: Effortful to raise leg> left, Less pain with external stabilization, + test  Left:  Less pain with external stabilization, + test  Current: MET 12/15/22: same bilaterally with and without external support      6. Patient will have appropriate knowledge of pelvic floor, TA, and urinary/bowel/pelvic pain symptoms for appropriate self care pre-natally and post-natally for independent management and referral if necessary.               Eval status: patient unaware of normal functioning of pelvic floor, TA, and possibly symptoms on genitourinary systems pre- and post-natally              Current: 12/15/22: patient with increased awareness and improved body positioning application for symptom management         PLAN  []  Upgrade activities as tolerated     [x]  Continue plan of care  []  Update interventions per flow sheet       []  Discharge due to:_  []  Other:_      Olga Bella, PT 1/6/2023  1:11 PM    Future Appointments   Date Time Provider Angelo Deal   1/10/2023 11:00 AM Zeke Bose, PT [facility]   1/12/2023 12:00 PM Zeke First, PT [facility]   1/17/2023 11:00 AM Zeke First, PT [facility]   1/19/2023 10:00 AM Zeke First, PT [facility]

## 2023-01-10 ENCOUNTER — APPOINTMENT (OUTPATIENT)
Dept: PHYSICAL THERAPY | Age: 23
End: 2023-01-10
Payer: OTHER GOVERNMENT

## 2023-01-12 ENCOUNTER — HOSPITAL ENCOUNTER (OUTPATIENT)
Dept: PHYSICAL THERAPY | Age: 23
Discharge: HOME | End: 2023-01-12
Payer: OTHER GOVERNMENT

## 2023-01-12 PROCEDURE — 97112 NEUROMUSCULAR REEDUCATION: CPT

## 2023-01-12 PROCEDURE — 97110 THERAPEUTIC EXERCISES: CPT

## 2023-01-12 PROCEDURE — 97530 THERAPEUTIC ACTIVITIES: CPT

## 2023-01-12 NOTE — PROGRESS NOTES
[facility] at the [address], [place], [address] [facility]  Phone: [phone]      Fax:  [phone]    Progress Note  Patient name: Gaviota Mayorga Start of Care: 2022   Referral source: Magaly Ash MD : 2000               Medical Diagnosis: Other low back pain [M54.59]    Onset Date:22               Treatment Diagnosis: Other low back pain [M54.59]   Prior Hospitalization: see medical history Provider#: [id number]   Medications: Verified on Patient summary List    Comorbidities: 24 weeks pregnant with first child at eval   Prior Level of Function: functionally independent, no modifications, active lifestyle, works mainly while sitting but consistently up and down    Visits from Start of Care: 9    Missed Visits: 0    Progress Towards Goals: Short Term Goals: To be accomplished in 3 weeks :  Patient will be independent and compliant with HEP to progress toward goals and restore functional mobility. Eval Status: issued at [facility]  PN Status 12/15/22: patient reports compliance with HEP and application of body positioning   PN Status 23: pt voices doing HEP every other day progressing     Patient will demonstrate consistent innominate symmetry for less pain during transfers at work. Eval Status: Innominate upslip on left  PN Status 12/15/22: patient presents in alignment today followin correction on 22. Patient educated and performing HEP to improve symmetry  PN Status 23: pt demos progress with hip shifting activities indicating improvement in pelvic innominate symmetry progressing     Patient will improve pain in hip, lumbar spine, and SIJ to some instances of 0/10  to improve walking tolerance and restore prior level of function. Eval Status: Best: 2-3/10 during rest, first thing in the morning  PN Status: 12/15/22: 1-2/10 at best  PN Status 23: 5/10 worst pain, 0/10 at best progressing     Long Term Goals:  To be accomplished in 6 weeks:  Patient will improve FOTO score by 23 points (to 74 points) to improve functional tolerance for working tolerance. Eval Status: FOTO 51  PN Status 1/12/23: (measured on 12/29/22) 72 progressing  FOTO score = an established functional score where 100 = no disability  Current: 12/15/22: to be assessed at next visit due to time constraints     2. Pt will have painfree hip AROM to aid in functional mechanics for ambulation/ADLs. Eval Status: Hip ROM: WNL, hip pain and end range IR>ER, Righ>left; Flexion past 90 degrees provokes SIJ symptoms  Current: MET12/15/22: Patinet demonstrates full hip flexion ROM within allowance of pregrnancy and full hip IR/ER without discomfort. Reports no functional limitations     3. Pt will have 4+/5 hip ER/IR strength to return to goals of exercising through pregnancy without pain. Eval Status:     L(0-5) R (0-5)   Hip ER- seated 4 pain 3+ pain   Hip IR- seated 4 pain 3+ pain    Current: 12/15/22:        L(0-5) R (0-5)   Hip ER- seated 4+ 4-   Hip IR- seated 4+ 4-    PN Status 1/12/23: unchanged    L(0-5) R (0-5)   Hip ER- seated 4+ 4-   Hip IR- seated 4+ 4-         4. Patient will improve pain in hip, lumbar spine, and SIJ  to 2-3/10 at worst to improve work tolerance and quality and restore prior level of function. Eval Status: Worst: 9/10 during nighttime, trying to fall asleep/10 at worst  PN Status 12/15/22: 8/10 at worst at night, less frequent (only 1-2 times per week vs. 4-5 times at evaluation) and less duration  Status 1/4/23:  reports 3-4/10 pain today and overall improvements in pain intensity and sleep  PN Status 1/12/23: 5/10 worst pain, 0/10 at best progressing     5. Pt will have negative and symmetrical ASLR to improve ability to bend over for ADLs.   Eval Status: ASLR: Active Straight Leg Raise Test   Right: Effortful to raise leg> left, Less pain with external stabilization, + test  Left:  Less pain with external stabilization, + test  Current: MET 12/15/22: same bilaterally with and without external support      6. Patient will have appropriate knowledge of pelvic floor, TA, and urinary/bowel/pelvic pain symptoms for appropriate self care pre-natally and post-natally for independent management and referral if necessary. Eval status: patient unaware of normal functioning of pelvic floor, TA, and possibly symptoms on genitourinary systems pre- and post-natally              PN Status: 12/15/22: patient with increased awareness and improved body positioning application for symptom management  PN Status 1/12/23: pt demos increased awareness of body and lumbopelvic position for squatting, lunging, and other functional mobility but cont to require mod cues to limit compensation patterns on right LE progressing      Key Functional Changes: Pt making great progress with prenatal management of SIJ, hip, and LBP; due date 3/10/23. Voices subjective improvements in pain intensity, pain frequency. Demos objective improvements in hip ER/IR mobility, pelvic innominate symmetry and mobility, body awareness for lumbopelvic positioning with upright activity. Pt cont to be limited by right LE proximal instability with upright asymmetrical activity and 5/10 LBP with prolonged standing at work and home. Will cont to benefit from PT at reduced frequency of 1x week for 6 more weeks to cont progression of strengthening and mobility. Patient will continue to benefit from skilled PT services to modify and progress therapeutic interventions, address functional mobility deficits, address ROM deficits, address strength deficits, analyze and address soft tissue restrictions, analyze and cue movement patterns, analyze and modify body mechanics/ergonomics, assess and modify postural abnormalities, address imbalance/dizziness, and instruct in home and community integration to attain remaining goals.     Updated Goals: to be achieved in 6 treatments:   See updated goals above  ASSESSMENT/RECOMMENDATIONS:  []Continue therapy per initial plan/protocol at a frequency of  x per week for  weeks  [x]Continue therapy with the following recommended changes: 1 x per week for 6 more weeks   []Discontinue therapy progressing towards or have reached established goals  []Discontinue therapy due to lack of appreciable progress towards goals  []Discontinue therapy due to lack of attendance or compliance  []Await Physician's recommendations/decisions regarding therapy  []Other:________________________________________________________________    Thank you for this referral.   Gale Rosario, PT 1/12/2023 11:42 AM  NOTE TO PHYSICIAN:  Kay Mckoy [address]   [facility]: [phone]  If you are unable to process this request in 24 hours please contact our office: [phone]        []  I have read the above report and request that my patient continue as recommended. []  I have read the above report and request that my patient continue therapy with the following changes/special instructions:________________________________________  []I have read the above report and request that my patient be discharged from therapy.     Physician's Signature:____________Date:_________TIME:________                                      Zach Moraes MD      ** Signature, Date and Time must be completed for valid certification **

## 2023-01-12 NOTE — PROGRESS NOTES
PT DAILY TREATMENT NOTE    Patient Name: London Degroot  Date:2023  : 2000  [x]  Patient  Verified  Payor: MICHAEL / Plan: Deepak Colon 74 / Product Type:  /    In time:11:58am  Out time:12:46pm  Total Treatment Time (min): 48  Total Timed Codes (min): 48  1:1 Treatment Time (MC/BCBS only): NA   Visit #: 9 of 12    Treatment Dx: Other low back pain [M54.59]    SUBJECTIVE  Pain Level (0-10 scale): 0  Any medication changes, allergies to medications, adverse drug reactions, diagnosis change, or new procedure performed?: [x] No    [] Yes (see summary sheet for update)  Subjective functional status/changes:   [] No changes reported   Pt reports pain improvements. Still having pain with work (being on feet all day) and sometimes after exercising.) Good HEP compliance.  Agreeable to reducing PT frequency to 1x/wk    OBJECTIVE     10 min Therapeutic Exercise:  [x] See flow sheet : lumbopelvic mobility   Rationale: increase ROM, increase strength, improve coordination, improve balance, and increase proprioception to improve the patients ability to perform daily activities with decreased pain and symptom levels     25 min Therapeutic Activity:  [x]  See flow sheet : lunging and hinging with inc single-leg loading for improved tolerance for ADLs   Rationale: increase ROM, increase strength, improve coordination, improve balance, and increase proprioception  to improve the patients ability to perform daily activities with decreased pain and symptom levels     13 min Neuromuscular Re-education:  [x]  See flow sheet : hip shifting and pelvic positioning with tactile and verbal cues   Rationale: increase ROM, increase strength, improve coordination, improve balance, and increase proprioception  to improve the patients ability to perform daily activities with decreased pain and symptom levels  independently and effectively attain goals and progress towards long term management of current condition. With   [] TE   [] TA   [] neuro   [] other: Patient Education: [x] Review HEP    [] Progressed/Changed HEP based on:   [] positioning   [] body mechanics   [] transfers   [] heat/ice application    [] other:      Other Objective/Functional Measures: see updated goals below     Pain Level (0-10 scale) post treatment: 0    ASSESSMENT/Changes in Function:  Pt making great progress with prenatal management of SIJ, hip, and LBP; due date 3/10/23. Voices subjective improvements in pain intensity, pain frequency. Demos objective improvements in hip ER/IR mobility, pelvic innominate symmetry and mobility, body awareness for lumbopelvic positioning with upright activity. Pt cont to be limited by right LE proximal instability with upright asymmetrical activity and 5/10 LBP with prolonged standing at work and home. Will cont to benefit from PT at reduced frequency of 1x week for 6 more weeks to cont progression of strengthening and mobility. Patient will continue to benefit from skilled PT services to modify and progress therapeutic interventions, address functional mobility deficits, address ROM deficits, address strength deficits, analyze and address soft tissue restrictions, analyze and cue movement patterns, analyze and modify body mechanics/ergonomics, assess and modify postural abnormalities, address imbalance/dizziness, and instruct in home and community integration to attain remaining goals. [x]  See Plan of Care  [x]  See progress note/recertification  []  See Discharge Summary         Progress towards goals / Updated goals:  Short Term Goals: To be accomplished in 3 weeks :  Patient will be independent and compliant with HEP to progress toward goals and restore functional mobility.    Eval Status: issued at eval  PN Status 12/15/22: patient reports compliance with HEP and application of body positioning   PN Status 1/12/23: pt voices doing HEP every other day progressing     Patient will demonstrate consistent innominate symmetry for less pain during transfers at work. Eval Status: Innominate upslip on left  PN Status 12/15/22: patient presents in alignment today followin correction on 12/13/22. Patient educated and performing HEP to improve symmetry  PN Status 1/12/23: pt demos progress with hip shifting activities indicating improvement in pelvic innominate symmetry progressing     Patient will improve pain in hip, lumbar spine, and SIJ to some instances of 0/10  to improve walking tolerance and restore prior level of function. Eval Status: Best: 2-3/10 during rest, first thing in the morning  PN Status: 12/15/22: 1-2/10 at best  PN Status 1/12/23: 5/10 worst pain, 0/10 at best progressing     Long Term Goals: To be accomplished in 6 weeks:  Patient will improve FOTO score by 23 points (to 74 points) to improve functional tolerance for working tolerance. Eval Status: FOTO 51  PN Status 1/12/23: (measured on 12/29/22) 72 progressing  FOTO score = an established functional score where 100 = no disability  Current: 12/15/22: to be assessed at next visit due to time constraints     2. Pt will have painfree hip AROM to aid in functional mechanics for ambulation/ADLs. Eval Status: Hip ROM: WNL, hip pain and end range IR>ER, Righ>left; Flexion past 90 degrees provokes SIJ symptoms  Current: MET12/15/22: Patinet demonstrates full hip flexion ROM within allowance of pregrnancy and full hip IR/ER without discomfort. Reports no functional limitations     3. Pt will have 4+/5 hip ER/IR strength to return to goals of exercising through pregnancy without pain. Eval Status:     L(0-5) R (0-5)   Hip ER- seated 4 pain 3+ pain   Hip IR- seated 4 pain 3+ pain    Current: 12/15/22:        L(0-5) R (0-5)   Hip ER- seated 4+ 4-   Hip IR- seated 4+ 4-    PN Status 1/12/23: unchanged    L(0-5) R (0-5)   Hip ER- seated 4+ 4-   Hip IR- seated 4+ 4-        4.    Patient will improve pain in hip, lumbar spine, and SIJ  to 2-3/10 at worst to improve work tolerance and quality and restore prior level of function. Eval Status: Worst: 9/10 during nighttime, trying to fall asleep/10 at worst  PN Status 12/15/22: 8/10 at worst at night, less frequent (only 1-2 times per week vs. 4-5 times at evaluation) and less duration  Status 1/4/23:  reports 3-4/10 pain today and overall improvements in pain intensity and sleep  PN Status 1/12/23: 5/10 worst pain, 0/10 at best progressing     5. Pt will have negative and symmetrical ASLR to improve ability to bend over for ADLs. Eval Status: ASLR: Active Straight Leg Raise Test   Right: Effortful to raise leg> left, Less pain with external stabilization, + test  Left:  Less pain with external stabilization, + test  Current: MET 12/15/22: same bilaterally with and without external support      6. Patient will have appropriate knowledge of pelvic floor, TA, and urinary/bowel/pelvic pain symptoms for appropriate self care pre-natally and post-natally for independent management and referral if necessary.               Eval status: patient unaware of normal functioning of pelvic floor, TA, and possibly symptoms on genitourinary systems pre- and post-natally              PN Status: 12/15/22: patient with increased awareness and improved body positioning application for symptom management  PN Status 1/12/23: pt demos increased awareness of body and lumbopelvic position for squatting, lunging, and other functional mobility but cont to require mod cues to limit compensation patterns on right LE progressing      PLAN  []  Upgrade activities as tolerated     [x]  Continue plan of care  []  Update interventions per flow sheet       []  Discharge due to:_  []  Other:_      Amy Daugherty, PT 1/12/2023  11:29 AM    Future Appointments   Date Time Provider [place]   1/12/2023 12:00 PM Pavel Loya PT LU[facility]   1/17/2023 11:00 AM YUN Olvera [facility]   1/19/2023 10:00 AM Kody Donnie Mcfadden [facility]

## 2023-01-17 ENCOUNTER — HOSPITAL ENCOUNTER (OUTPATIENT)
Dept: PHYSICAL THERAPY | Age: 23
Discharge: HOME | End: 2023-01-17
Payer: OTHER GOVERNMENT

## 2023-01-17 PROCEDURE — 97530 THERAPEUTIC ACTIVITIES: CPT

## 2023-01-17 PROCEDURE — 97112 NEUROMUSCULAR REEDUCATION: CPT

## 2023-01-17 PROCEDURE — 97110 THERAPEUTIC EXERCISES: CPT

## 2023-01-17 NOTE — PROGRESS NOTES
PT DAILY TREATMENT NOTE    Patient Name: Amanda Jones  Date:2023   : 2000  [x]  Patient  Verified  Payor: MICHAEL / Plan: Deepak Colon 74 / Product Type:  /    In time:11:00am  Out time:11:41AM  Total Treatment Time (min): 41  Total Timed Codes (min): 41  1:1 Treatment Time (MC/BCBS only): NA   Visit #: 10 of 15    Treatment Dx: Other low back pain [M54.59]    SUBJECTIVE  Pain Level (0-10 scale): 0  Any medication changes, allergies to medications, adverse drug reactions, diagnosis change, or new procedure performed?: [x] No    [] Yes (see summary sheet for update)  Subjective functional status/changes:   [] No changes reported   Pt reports that her blood pressure has been high and she's been lightheaded the past few days. Reports 135/95 SBP/DBP this AM. She is seeing her OBGYN tomorrow d/t showing sxs of preeclampsia.  LBP and pelvic girdle pain well managed other than loraine feliz contractions    OBJECTIVE    8 min Therapeutic Exercise:  [x] See flow sheet :   Rationale: increase ROM, increase strength, improve coordination, improve balance, and increase proprioception to improve the patients ability to perform daily activities with decreased pain and symptom levels     10 min Therapeutic Activity:  [x]  See flow sheet : activities to improve independence with squatting and transfers   Rationale: increase ROM, increase strength, improve coordination, improve balance, and increase proprioception  to improve the patients ability to perform daily activities with decreased pain and symptom levels     23 min Neuromuscular Re-education:  [x]  See flow sheet : tactile, verbal and visual cueing (use of mirror) for lateral lunging and split squats with focus on body awareness and reducing APT and compensation at pelvis/hips   Rationale: increase ROM, increase strength, improve coordination, improve balance, and increase proprioception  to improve the patients ability to perform daily activities with decreased pain and symptom levels          With   [] TE   [x] TA   [] neuro   [] other: Patient Education: [x] Review HEP    [] Progressed/Changed HEP based on:   [x] positioning   [x] body mechanics   [] transfers   [] heat/ice application    [] other:      Other Objective/Functional Measures: Pt demos medial columnar collapse on right LE with upright activities     Pain Level (0-10 scale) post treatment: 0    ASSESSMENT/Changes in Function: Pt reports increased BP over past 2 days; reports 135/95 this AM. Monitored for changes in sxs throughout session; pt tolerated all activities well except for inc Nando feliz contractions with SB rollouts. Progressed activities to improve hip IR AROM and strength in order to aid in functional mechanics for gait and transfers. Provided tactile and verbal cues for neutral pelvic alignment/to improve left posterior hip shift. Pt benefits from use of mirror for visual feedback in order to maintain optimal lumbopelvic positioning with lateral lunges and split squats. Patient will continue to benefit from skilled PT services to modify and progress therapeutic interventions, address functional mobility deficits, address ROM deficits, address strength deficits, analyze and address soft tissue restrictions, analyze and cue movement patterns, analyze and modify body mechanics/ergonomics, assess and modify postural abnormalities, and address imbalance/dizziness to attain remaining goals. [x]  See Plan of Care  []  See progress note/recertification  []  See Discharge Summary         Progress towards goals / Updated goals:  Short Term Goals: To be accomplished in 3 weeks :  Patient will be independent and compliant with HEP to progress toward goals and restore functional mobility.    Eval Status: issued at eval  PN Status 1/12/23: pt voices doing HEP every other day progressing     Patient will demonstrate consistent innominate symmetry for less pain during transfers at work. Eval Status: Innominate upslip on left  PN Status 1/12/23: pt demos progress with hip shifting activities indicating improvement in pelvic innominate symmetry progressing     Patient will improve pain in hip, lumbar spine, and SIJ to some instances of 0/10  to improve walking tolerance and restore prior level of function. Eval Status: Best: 2-3/10 during rest, first thing in the morning  PN Status 1/12/23: 5/10 worst pain, 0/10 at best progressing     Long Term Goals: To be accomplished in 6 weeks:  Patient will improve FOTO score by 23 points (to 74 points) to improve functional tolerance for working tolerance. Eval Status: FOTO 51  PN Status 1/12/23: (measured on 12/29/22) 72 progressing  FOTO score = an established functional score where 100 = no disability  Current: 12/15/22: to be assessed at next visit due to time constraints     2. Pt will have painfree hip AROM to aid in functional mechanics for ambulation/ADLs. Eval Status: Hip ROM: WNL, hip pain and end range IR>ER, Righ>left; Flexion past 90 degrees provokes SIJ symptoms  Current: MET12/15/22: Patinet demonstrates full hip flexion ROM within allowance of pregrnancy and full hip IR/ER without discomfort. Reports no functional limitations     3. Pt will have 4+/5 hip ER/IR strength to return to goals of exercising through pregnancy without pain. Eval Status:     L(0-5) R (0-5)   Hip ER- seated 4 pain 3+ pain   Hip IR- seated 4 pain 3+ pain      PN Status 1/12/23: unchanged    L(0-5) R (0-5)   Hip ER- seated 4+ 4-   Hip IR- seated 4+ 4-    Status 1/17/23: progressed activities to improve hip IR AROM and strength in closed chain positions to aid in functional mechanics for transfers     4. Patient will improve pain in hip, lumbar spine, and SIJ  to 2-3/10 at worst to improve work tolerance and quality and restore prior level of function. Eval Status:  Worst: 9/10 during nighttime, trying to fall asleep/10 at worst  PN Status 1/12/23: 5/10 worst pain, 0/10 at best progressing     5. Pt will have negative and symmetrical ASLR to improve ability to bend over for ADLs. Eval Status: ASLR: Active Straight Leg Raise Test   Right: Effortful to raise leg> left, Less pain with external stabilization, + test  Left:  Less pain with external stabilization, + test  Current: MET 12/15/22: same bilaterally with and without external support      6. Patient will have appropriate knowledge of pelvic floor, TA, and urinary/bowel/pelvic pain symptoms for appropriate self care pre-natally and post-natally for independent management and referral if necessary.               Eval status: patient unaware of normal functioning of pelvic floor, TA, and possibly symptoms on genitourinary systems pre- and post-natally  PN Status 1/12/23: pt demos increased awareness of body and lumbopelvic position for squatting, lunging, and other functional mobility but cont to require mod cues to limit compensation patterns on right LE progressing      PLAN  [x]  Upgrade activities as tolerated     [x]  Continue plan of care  [x]  Update interventions per flow sheet       []  Discharge due to:_  []  Other:_      Gale Rosario, PT 1/17/2023  10:24 AM    Future Appointments   Date Time Provider [place] Deal   1/17/2023 11:00 AM Thor Haseeb, PT [facility]   1/19/2023 10:00 AM Thor Haseeb, PT [facility]   1/31/2023  1:30 PM Thor Haseeb, PT [facility]   2/7/2023  1:30 PM Thor Haseeb, PT [facility]   2/14/2023  1:30 PM Thor Haseeb, PT [facility]   2/21/2023  1:30 PM Thor Haseeb, PT [facility]   2/28/2023  1:30 PM Thor Haseeb, PT [facility]

## 2023-01-18 ENCOUNTER — HOSPITAL ENCOUNTER (EMERGENCY)
Age: 23
Discharge: HOME | End: 2023-01-18
Attending: OBSTETRICS & GYNECOLOGY | Admitting: OBSTETRICS & GYNECOLOGY
Payer: OTHER GOVERNMENT

## 2023-01-18 VITALS
TEMPERATURE: 98.3 F | DIASTOLIC BLOOD PRESSURE: 81 MMHG | HEART RATE: 87 BPM | RESPIRATION RATE: 16 BRPM | HEIGHT: 68 IN | OXYGEN SATURATION: 100 % | BODY MASS INDEX: 27.89 KG/M2 | WEIGHT: 184 LBS | SYSTOLIC BLOOD PRESSURE: 122 MMHG

## 2023-01-18 LAB
ALBUMIN SERPL-MCNC: 3 G/DL (ref 3.4–5)
ALBUMIN/GLOB SERPL: 0.8 (ref 0.8–1.7)
ALP SERPL-CCNC: 89 U/L (ref 45–117)
ALT SERPL-CCNC: 21 U/L (ref 13–56)
ANION GAP SERPL CALC-SCNC: 6 MMOL/L (ref 3–18)
APPEARANCE UR: CLEAR
AST SERPL-CCNC: 15 U/L (ref 10–38)
BILIRUB SERPL-MCNC: 0.3 MG/DL (ref 0.2–1)
BILIRUB UR QL: NEGATIVE
BUN SERPL-MCNC: 8 MG/DL (ref 7–18)
BUN/CREAT SERPL: 17 (ref 12–20)
CALCIUM SERPL-MCNC: 8.7 MG/DL (ref 8.5–10.1)
CHLORIDE SERPL-SCNC: 104 MMOL/L (ref 100–111)
CO2 SERPL-SCNC: 26 MMOL/L (ref 21–32)
COLOR UR: ABNORMAL
CREAT SERPL-MCNC: 0.48 MG/DL (ref 0.6–1.3)
CREAT UR-MCNC: <13 MG/DL (ref 30–125)
ERYTHROCYTE [DISTWIDTH] IN BLOOD BY AUTOMATED COUNT: 12.6 % (ref 11.6–14.5)
GLOBULIN SER CALC-MCNC: 3.8 G/DL (ref 2–4)
GLUCOSE SERPL-MCNC: 74 MG/DL (ref 74–99)
GLUCOSE UR QL STRIP.AUTO: NEGATIVE MG/DL
HCT VFR BLD AUTO: 38.1 % (ref 35–45)
HGB BLD-MCNC: 12.8 G/DL (ref 12–16)
KETONES UR-MCNC: NEGATIVE MG/DL
LDH SERPL L TO P-CCNC: 167 U/L (ref 81–234)
LEUKOCYTE ESTERASE UR QL STRIP: ABNORMAL
MCH RBC QN AUTO: 32.4 PG (ref 24–34)
MCHC RBC AUTO-ENTMCNC: 33.6 G/DL (ref 31–37)
MCV RBC AUTO: 96.5 FL (ref 78–100)
NITRITE UR QL: NEGATIVE
NRBC # BLD: 0 K/UL (ref 0–0.01)
NRBC BLD-RTO: 0 PER 100 WBC
PH UR: 7 (ref 5–9)
PLATELET # BLD AUTO: 138 K/UL (ref 135–420)
PMV BLD AUTO: 12.6 FL (ref 9.2–11.8)
POTASSIUM SERPL-SCNC: 4 MMOL/L (ref 3.5–5.5)
PROT SERPL-MCNC: 6.8 G/DL (ref 6.4–8.2)
PROT UR QL: NEGATIVE MG/DL
PROT UR-MCNC: <5 MG/DL
PROT/CREAT UR-RTO: ABNORMAL
RBC # BLD AUTO: 3.95 M/UL (ref 4.2–5.3)
RBC # UR STRIP: ABNORMAL
SERVICE CMNT-IMP: ABNORMAL
SODIUM SERPL-SCNC: 136 MMOL/L (ref 136–145)
SP GR UR: 1.01 (ref 1–1.02)
URATE SERPL-MCNC: 3.4 MG/DL (ref 2.6–7.2)
UROBILINOGEN UR QL: 0.2 EU/DL (ref 0.2–1)
WBC # BLD AUTO: 10.1 K/UL (ref 4.6–13.2)

## 2023-01-18 PROCEDURE — 85027 COMPLETE CBC AUTOMATED: CPT

## 2023-01-18 PROCEDURE — 84550 ASSAY OF BLOOD/URIC ACID: CPT

## 2023-01-18 PROCEDURE — 83615 LACTATE (LD) (LDH) ENZYME: CPT

## 2023-01-18 PROCEDURE — 59025 FETAL NON-STRESS TEST: CPT

## 2023-01-18 PROCEDURE — 99282 EMERGENCY DEPT VISIT SF MDM: CPT

## 2023-01-18 PROCEDURE — 80053 COMPREHEN METABOLIC PANEL: CPT

## 2023-01-18 PROCEDURE — 84156 ASSAY OF PROTEIN URINE: CPT

## 2023-01-18 PROCEDURE — 81003 URINALYSIS AUTO W/O SCOPE: CPT

## 2023-01-18 RX ORDER — LANOLIN ALCOHOL/MO/W.PET/CERES
CREAM (GRAM) TOPICAL
COMMUNITY

## 2023-01-18 NOTE — DISCHARGE INSTRUCTIONS
Counting Your Baby's Kicks: Care Instructions  Overview     Counting your baby's kicks is one way your doctor can tell that your baby is healthy. Most women--especially in a first pregnancy--feel their baby move for the first time between 16 and 22 weeks. The movement may feel like flutters rather than kicks. Your baby may move more at certain times of the day. When you are active, you may notice less kicking than when you are resting. At your prenatal visits, your doctor will ask whether the baby is active. In your last trimester, your doctor may ask you to count the number of times you feel your baby move. Follow-up care is a key part of your treatment and safety. Be sure to make and go to all appointments, and call your doctor if you are having problems. It's also a good idea to know your test results and keep a list of the medicines you take. How do you count fetal kicks? A common method of checking your baby's movement is to note the length of time it takes to count ten movements (such as kicks, flutters, or rolls). Pick your baby's most active time of day to count. This may be any time from morning to evening. If you don't feel 10 movements in an hour, have something to eat or drink and count for another hour. If you don't feel at least 10 movements in the 2-hour period, call your doctor. When should you call for help? Call your doctor now or seek immediate medical care if:    You noticed that your baby has stopped moving or is moving much less than normal.   Watch closely for changes in your health, and be sure to contact your doctor if you have any problems. Where can you learn more? Go to http://www.gray.com/  Enter [id number] in the search box to learn more about \"Counting Your Baby's Kicks: Care Instructions. \"  Current as of: February 23, 2022               Content Version: 13.4  © 1381-9674 Healthwise, [employer].    Care instructions adapted under license by [facility] (which disclaims liability or warranty for this information). If you have questions about a medical condition or this instruction, always ask your healthcare professional. [address] any warranty or liability for your use of this information. Pregnancy Precautions: Care Instructions  Your Care Instructions     There is no sure way to prevent labor before your due date ( labor) or to prevent most other pregnancy problems. But there are things you can do to increase your chances of a healthy pregnancy. Go to your appointments, follow your doctor's advice, and take good care of yourself. Eat well, and exercise (if your doctor agrees). And make sure to drink plenty of water. Follow-up care is a key part of your treatment and safety. Be sure to make and go to all appointments, and call your doctor if you are having problems. It's also a good idea to know your test results and keep a list of the medicines you take. How can you care for yourself at home? Make sure you go to your prenatal appointments. At each visit, your doctor will check your blood pressure. Your doctor will also check to see if you have protein in your urine. High blood pressure and protein in urine are signs of preeclampsia. This condition can be dangerous for you and your baby. Drink plenty of fluids. Dehydration can cause contractions. If you have kidney, heart, or liver disease and have to limit fluids, talk with your doctor before you increase the amount of fluids you drink. Tell your doctor right away if you notice any symptoms of an infection, such as:  Burning when you urinate. A foul-smelling discharge from your vagina. Vaginal itching. Unexplained fever. Unusual pain or soreness in your uterus or lower belly. Eat a balanced diet. Include plenty of foods that are high in calcium and iron. Foods high in calcium include milk, cheese, yogurt, almonds, and broccoli.   Foods high in iron include red meat, shellfish, poultry, eggs, beans, raisins, whole-grain bread, and leafy green vegetables. Do not smoke. If you need help quitting, talk to your doctor about stop-smoking programs and medicines. These can increase your chances of quitting for good. Do not drink alcohol or use marijuana or illegal drugs. Follow your doctor's directions about activity. Your doctor will let you know how much, if any, exercise you can do. Ask your doctor if you can have sex. If you are at risk for early labor, your doctor may ask you to not have sex. Take care to prevent falls. During pregnancy, your joints are loose, and your balance is off. Sports such as bicycling, skiing, or in-line skating can increase your risk of falling. And don't ride horses or motorcycles, dive, water ski, scuba dive, or parachute jump while you are pregnant. Avoid things that can make your body too hot and may be harmful to your baby, such as a hot tub or sauna. Or talk with your doctor before doing anything that raises your body temperature. Your doctor can tell you if it's safe. Do not take any over-the-counter or herbal medicines or supplements without talking to your doctor or pharmacist first.  When should you call for help? Call 911  anytime you think you may need emergency care. For example, call if:    You passed out (lost consciousness). You have a seizure. You have severe vaginal bleeding. You have severe pain in your belly or pelvis. You have had fluid gushing or leaking from your vagina and you know or think the umbilical cord is bulging into your vagina. If this happens, immediately get down on your knees so your rear end (buttocks) is higher than your head. This will decrease the pressure on the cord until help arrives. Call your doctor now or seek immediate medical care if:    You have signs of preeclampsia, such as:  Sudden swelling of your face, hands, or feet.   New vision problems (such as dimness, blurring, or seeing spots). A severe headache. You have any vaginal bleeding. You have belly pain or cramping. You have a fever. You have had regular contractions (with or without pain) for an hour. This means that you have 8 or more within 1 hour or 4 or more in 20 minutes after you change your position and drink fluids. You have a sudden release of fluid from your vagina. You have low back pain or pelvic pressure that does not go away. You notice that your baby has stopped moving or is moving much less than normal.   Watch closely for changes in your health, and be sure to contact your doctor if you have any problems. Where can you learn more? Go to http://www.[facility].com/  Enter Y951 in the search box to learn more about \"Pregnancy Precautions: Care Instructions. \"  Current as of: February 23, 2022               Content Version: 13.4  © 2006-2022 [employer]. Care instructions adapted under license by [employer] (which disclaims liability or warranty for this information). If you have questions about a medical condition or this instruction, always ask your healthcare professional. [place] [address] any warranty or liability for your use of this information.

## 2023-01-18 NOTE — PROGRESS NOTES
Pt presents to the unit, from the office, for elevated BP, accompanied by a HA and visual disturbances. She has GDM, diet controlled, and has no other complaints. She is feeling fetal movement. Urine obtained and VSS. 1215- Orders obtained from CNM. Pt keeps daily log of BS and meals, as well as BP and HR.     1245- Pt declines medication at this time. States she would like to eat lunch first to see if that helps with HA.     1330- Labs reviewed with CNM. Pt stable for discharge once finished with lunch. 80- Pt declines additional needs, ready for discharge. Labor precautions reviewed and s/s to report. No questions or concerns. Ambulated off unit in stable condition.

## 2023-01-19 ENCOUNTER — HOSPITAL ENCOUNTER (OUTPATIENT)
Dept: PHYSICAL THERAPY | Age: 23
Discharge: HOME | End: 2023-01-19
Payer: OTHER GOVERNMENT

## 2023-01-19 PROCEDURE — 97110 THERAPEUTIC EXERCISES: CPT

## 2023-01-19 PROCEDURE — 97530 THERAPEUTIC ACTIVITIES: CPT

## 2023-01-19 PROCEDURE — 97112 NEUROMUSCULAR REEDUCATION: CPT

## 2023-01-19 NOTE — PROGRESS NOTES
PT DAILY TREATMENT NOTE    Patient Name: Jessica Both  Date:2023   : 2000  [x]  Patient  Verified  Payor: MICHAEL / Plan: Deepak Colon 74 / Product Type:  /    In time:10:03am  Out time:10:50am  Total Treatment Time (min): 47  Total Timed Codes (min): 47  1:1 Treatment Time (MC/BCBS only): NA   Visit #: 11 of 15    Treatment Dx: Other low back pain [M54.59]    SUBJECTIVE  Pain Level (0-10 scale): 0  Any medication changes, allergies to medications, adverse drug reactions, diagnosis change, or new procedure performed?: [x] No    [] Yes (see summary sheet for update)  Subjective functional status/changes:   [] No changes reported  Pt reports she was hospitalized d/t HTN; tested preeclampsia and gestational HTN and both came back normal. She is having visual issues; foggy eyes that intermittently last a few min. States that the doctors at the hospital weren't concerned since tests came back normal. Reports that she felt fine after last PT session.  No pain today other than Nando feliz contractions    OBJECTIVE    9 min Therapeutic Exercise:  [x] See flow sheet :   Rationale: increase ROM, increase strength, improve coordination, improve balance, and increase proprioception to improve the patients ability to perform daily activities with decreased pain and symptom levels     10 min Therapeutic Activity:  [x]  See flow sheet : activities to improve stability and pain with STS transfers and work related activities   Rationale: increase ROM, increase strength, improve coordination, improve balance, and increase proprioception  to improve the patients ability to perform daily activities with decreased pain and symptom levels     28 min Neuromuscular Re-education:  [x]  See flow sheet : pelvic mobility drills with core stabilization    Rationale: increase ROM, increase strength, improve coordination, improve balance, and increase proprioception  to improve the patients ability to perform daily activities with decreased pain and symptom levels          With   [] TE   [] TA   [] neuro   [] other: Patient Education: [x] Review HEP    [] Progressed/Changed HEP based on:   [] positioning   [] body mechanics   [] transfers   [] heat/ice application    [x] other: rec to see optometrist regarding visual concerns     Other Objective/Functional Measures: Minimal cues for mitigating medial columnar collapse with lateral lunges and hinging today    Pain Level (0-10 scale) post treatment: 0    ASSESSMENT/Changes in Function:  Pt reports hospitalization yesterday d/t HTN; states that tests for preeclampsia and gestational HTN came back negative but she is having residual visual issues (intermittent foggy vision.) Monitored for sxs throughout session. Progressed activities to improve lumbopelvic coordination to aid in mechanics for ADLs and work related tasks. Cues to isolate lumbopelvic motion with cat cows with SA activation to stabilize thorax. Demos improvement in pelvic girdle and hip coordination with hip shifting activities. Patient will continue to benefit from skilled PT services to modify and progress therapeutic interventions, address functional mobility deficits, address ROM deficits, address strength deficits, analyze and address soft tissue restrictions, analyze and cue movement patterns, analyze and modify body mechanics/ergonomics, assess and modify postural abnormalities, and address imbalance/dizziness to attain remaining goals. [x]  See Plan of Care  []  See progress note/recertification  []  See Discharge Summary         Progress towards goals / Updated goals:  Short Term Goals: To be accomplished in 3 weeks :  Patient will be independent and compliant with HEP to progress toward goals and restore functional mobility.    Eval Status: issued at eval  PN Status 1/12/23: pt voices doing HEP every other day progressing     Patient will demonstrate consistent innominate symmetry for less pain during transfers at work. Eval Status: Innominate upslip on left  PN Status 1/12/23: pt demos progress with hip shifting activities indicating improvement in pelvic innominate symmetry progressing  Status 1/19/23: continued activities to improve coordination of pelvic innominates with lunges and split squats plus use of external supports and tactile cues to facilitate movement with reduced compensation     Patient will improve pain in hip, lumbar spine, and SIJ to some instances of 0/10  to improve walking tolerance and restore prior level of function. Eval Status: Best: 2-3/10 during rest, first thing in the morning  PN Status 1/12/23: 5/10 worst pain, 0/10 at best progressing     Long Term Goals: To be accomplished in 6 weeks:  Patient will improve FOTO score by 23 points (to 74 points) to improve functional tolerance for working tolerance. Eval Status: FOTO 51  PN Status 1/12/23: (measured on 12/29/22) 72 progressing  FOTO score = an established functional score where 100 = no disability  Current: 12/15/22: to be assessed at next visit due to time constraints     2. Pt will have painfree hip AROM to aid in functional mechanics for ambulation/ADLs. Eval Status: Hip ROM: WNL, hip pain and end range IR>ER, Righ>left; Flexion past 90 degrees provokes SIJ symptoms  Current: MET12/15/22: Patinet demonstrates full hip flexion ROM within allowance of pregrnancy and full hip IR/ER without discomfort. Reports no functional limitations     3. Pt will have 4+/5 hip ER/IR strength to return to goals of exercising through pregnancy without pain. Eval Status:     L(0-5) R (0-5)   Hip ER- seated 4 pain 3+ pain   Hip IR- seated 4 pain 3+ pain       PN Status 1/12/23: unchanged    L(0-5) R (0-5)   Hip ER- seated 4+ 4-   Hip IR- seated 4+ 4-    Status 1/17/23: progressed activities to improve hip IR AROM and strength in closed chain positions to aid in functional mechanics for transfers     4.    Patient will improve pain in hip, lumbar spine, and SIJ  to 2-3/10 at worst to improve work tolerance and quality and restore prior level of function. Eval Status: Worst: 9/10 during nighttime, trying to fall asleep/10 at worst  PN Status 1/12/23: 5/10 worst pain, 0/10 at best progressing     5. Pt will have negative and symmetrical ASLR to improve ability to bend over for ADLs. Eval Status: ASLR: Active Straight Leg Raise Test   Right: Effortful to raise leg> left, Less pain with external stabilization, + test  Left:  Less pain with external stabilization, + test  Current: MET 12/15/22: same bilaterally with and without external support      6. Patient will have appropriate knowledge of pelvic floor, TA, and urinary/bowel/pelvic pain symptoms for appropriate self care pre-natally and post-natally for independent management and referral if necessary.               Eval status: patient unaware of normal functioning of pelvic floor, TA, and possibly symptoms on genitourinary systems pre- and post-natally  PN Status 1/12/23: pt demos increased awareness of body and lumbopelvic position for squatting, lunging, and other functional mobility but cont to require mod cues to limit compensation patterns on right LE progressing      PLAN  [x]  Upgrade activities as tolerated     [x]  Continue plan of care  [x]  Update interventions per flow sheet       []  Discharge due to:_  []  Other:_      Efrain Pardo, PT 1/19/2023  10:01 AM    Future Appointments   Date Time Provider Angelo Deal   1/31/2023  1:30 PM Oni Millard, PT [facility]   2/7/2023  1:30 PM Oni Millard, PT [facility]   2/14/2023  1:30 PM Oni Millard, PT [facility]   2/21/2023  1:30 PM Oni Millard, PT [facility]   2/28/2023  1:30 PM Oni Millard, PT [facility]

## 2023-01-27 ENCOUNTER — HOSPITAL ENCOUNTER (OUTPATIENT)
Age: 23
Discharge: HOME | End: 2023-01-27
Attending: OBSTETRICS & GYNECOLOGY | Admitting: STUDENT IN AN ORGANIZED HEALTH CARE EDUCATION/TRAINING PROGRAM
Payer: OTHER GOVERNMENT

## 2023-01-27 VITALS
SYSTOLIC BLOOD PRESSURE: 126 MMHG | BODY MASS INDEX: 27.58 KG/M2 | WEIGHT: 182 LBS | OXYGEN SATURATION: 98 % | HEIGHT: 68 IN | HEART RATE: 108 BPM | DIASTOLIC BLOOD PRESSURE: 80 MMHG | TEMPERATURE: 98 F | RESPIRATION RATE: 18 BRPM

## 2023-01-27 PROBLEM — Z3A.34 34 WEEKS GESTATION OF PREGNANCY: Status: ACTIVE | Noted: 2023-01-27

## 2023-01-27 PROBLEM — Z34.93 NORMAL IUP (INTRAUTERINE PREGNANCY) ON PRENATAL ULTRASOUND, THIRD TRIMESTER: Status: ACTIVE | Noted: 2023-01-27

## 2023-01-27 PROBLEM — O47.9 IRREGULAR CONTRACTIONS: Status: ACTIVE | Noted: 2023-01-27

## 2023-01-27 LAB
APPEARANCE UR: ABNORMAL
BACTERIA URNS QL MICRO: ABNORMAL /HPF
BILIRUB UR QL: NEGATIVE
COLOR UR: YELLOW
EPITH CASTS URNS QL MICRO: ABNORMAL /LPF (ref 0–5)
GLUCOSE UR STRIP.AUTO-MCNC: NEGATIVE MG/DL
HGB UR QL STRIP: NEGATIVE
KETONES UR QL STRIP.AUTO: NEGATIVE MG/DL
LEUKOCYTE ESTERASE UR QL STRIP.AUTO: ABNORMAL
NITRITE UR QL STRIP.AUTO: NEGATIVE
PH UR STRIP: 7 (ref 5–8)
PROT UR STRIP-MCNC: NEGATIVE MG/DL
RBC #/AREA URNS HPF: ABNORMAL /HPF (ref 0–5)
SP GR UR REFRACTOMETRY: 1.01 (ref 1–1.03)
UROBILINOGEN UR QL STRIP.AUTO: 0.2 EU/DL (ref 0.2–1)
WBC URNS QL MICRO: ABNORMAL /HPF (ref 0–5)

## 2023-01-27 PROCEDURE — 81003 URINALYSIS AUTO W/O SCOPE: CPT

## 2023-01-27 PROCEDURE — 99282 EMERGENCY DEPT VISIT SF MDM: CPT

## 2023-01-27 PROCEDURE — 59025 FETAL NON-STRESS TEST: CPT

## 2023-01-27 PROCEDURE — 81001 URINALYSIS AUTO W/SCOPE: CPT

## 2023-01-27 NOTE — PROGRESS NOTES
presents at 34.0 weeks gestation due to contractions that have been going on for 2 weeks now. EFM applied. Hx confirmed. Pt oriented to room.

## 2023-01-27 NOTE — PROGRESS NOTES
Maria Dolores Eduardo CNM at bedside for SVE. Pt tolerated well. No further orders received at this time.

## 2023-01-27 NOTE — DISCHARGE INSTRUCTIONS
Counting Your Baby's Kicks: Care Instructions  Overview     Counting your baby's kicks is one way your doctor can tell that your baby is healthy. Most women--especially in a first pregnancy--feel their baby move for the first time between 16 and 22 weeks. The movement may feel like flutters rather than kicks. Your baby may move more at certain times of the day. When you are active, you may notice less kicking than when you are resting. At your prenatal visits, your doctor will ask whether the baby is active. In your last trimester, your doctor may ask you to count the number of times you feel your baby move. Follow-up care is a key part of your treatment and safety. Be sure to make and go to all appointments, and call your doctor if you are having problems. It's also a good idea to know your test results and keep a list of the medicines you take. How do you count fetal kicks? A common method of checking your baby's movement is to note the length of time it takes to count ten movements (such as kicks, flutters, or rolls). Pick your baby's most active time of day to count. This may be any time from morning to evening. If you don't feel 10 movements in an hour, have something to eat or drink and count for another hour. If you don't feel at least 10 movements in the 2-hour period, call your doctor. When should you call for help? Call your doctor now or seek immediate medical care if:    You noticed that your baby has stopped moving or is moving much less than normal.   Watch closely for changes in your health, and be sure to contact your doctor if you have any problems. Where can you learn more? Go to http://www.gray.com/  Enter [id number] in the search box to learn more about \"Counting Your Baby's Kicks: Care Instructions. \"  Current as of: February 23, 2022               Content Version: 13.4  © 8243-9254 Healthwise, [employer].    Care instructions adapted under license by [facility] (which disclaims liability or warranty for this information). If you have questions about a medical condition or this instruction, always ask your healthcare professional. [address] any warranty or liability for your use of this information. Pregnancy Precautions: Care Instructions  Your Care Instructions     There is no sure way to prevent labor before your due date ( labor) or to prevent most other pregnancy problems. But there are things you can do to increase your chances of a healthy pregnancy. Go to your appointments, follow your doctor's advice, and take good care of yourself. Eat well, and exercise (if your doctor agrees). And make sure to drink plenty of water. Follow-up care is a key part of your treatment and safety. Be sure to make and go to all appointments, and call your doctor if you are having problems. It's also a good idea to know your test results and keep a list of the medicines you take. How can you care for yourself at home? Make sure you go to your prenatal appointments. At each visit, your doctor will check your blood pressure. Your doctor will also check to see if you have protein in your urine. High blood pressure and protein in urine are signs of preeclampsia. This condition can be dangerous for you and your baby. Drink plenty of fluids. Dehydration can cause contractions. If you have kidney, heart, or liver disease and have to limit fluids, talk with your doctor before you increase the amount of fluids you drink. Tell your doctor right away if you notice any symptoms of an infection, such as:  Burning when you urinate. A foul-smelling discharge from your vagina. Vaginal itching. Unexplained fever. Unusual pain or soreness in your uterus or lower belly. Eat a balanced diet. Include plenty of foods that are high in calcium and iron. Foods high in calcium include milk, cheese, yogurt, almonds, and broccoli.   Foods high in iron include red meat, shellfish, poultry, eggs, beans, raisins, whole-grain bread, and leafy green vegetables. Do not smoke. If you need help quitting, talk to your doctor about stop-smoking programs and medicines. These can increase your chances of quitting for good. Do not drink alcohol or use marijuana or illegal drugs. Follow your doctor's directions about activity. Your doctor will let you know how much, if any, exercise you can do. Ask your doctor if you can have sex. If you are at risk for early labor, your doctor may ask you to not have sex. Take care to prevent falls. During pregnancy, your joints are loose, and your balance is off. Sports such as bicycling, skiing, or in-line skating can increase your risk of falling. And don't ride horses or motorcycles, dive, water ski, scuba dive, or parachute jump while you are pregnant. Avoid things that can make your body too hot and may be harmful to your baby, such as a hot tub or sauna. Or talk with your doctor before doing anything that raises your body temperature. Your doctor can tell you if it's safe. Do not take any over-the-counter or herbal medicines or supplements without talking to your doctor or pharmacist first.  When should you call for help? Call 911  anytime you think you may need emergency care. For example, call if:    You passed out (lost consciousness). You have a seizure. You have severe vaginal bleeding. You have severe pain in your belly or pelvis. You have had fluid gushing or leaking from your vagina and you know or think the umbilical cord is bulging into your vagina. If this happens, immediately get down on your knees so your rear end (buttocks) is higher than your head. This will decrease the pressure on the cord until help arrives. Call your doctor now or seek immediate medical care if:    You have signs of preeclampsia, such as:  Sudden swelling of your face, hands, or feet.   New vision problems (such as dimness, blurring, or seeing spots). A severe headache. You have any vaginal bleeding. You have belly pain or cramping. You have a fever. You have had regular contractions (with or without pain) for an hour. This means that you have 8 or more within 1 hour or 4 or more in 20 minutes after you change your position and drink fluids. You have a sudden release of fluid from your vagina. You have low back pain or pelvic pressure that does not go away. You notice that your baby has stopped moving or is moving much less than normal.   Watch closely for changes in your health, and be sure to contact your doctor if you have any problems. Where can you learn more? Go to http://blanca-juventino.info/  Enter Y951 in the search box to learn more about \"Pregnancy Precautions: Care Instructions. \"  Current as of: February 23, 2022               Content Version: 13.4  © 2006-2022 [employer]. Care instructions adapted under license by [employer] (which disclaims liability or warranty for this information). If you have questions about a medical condition or this instruction, always ask your healthcare professional. [place] [address] any warranty or liability for your use of this information.

## 2023-01-27 NOTE — PROGRESS NOTES
Triage Progress Note      Patient is a  @34.0 weeks gestation presents to triage with contractions. Patient states she is having contractions every 15 minutes. Patient denies LOF, vaginal bleeding, and abnormal discharge. Patient endorses good fetal movement. Physical Exam:    Cervical Exam: closed   Membranes:  intact  Fetal Heart Rate: 145  Variability: moderate  Accelerations: present  Decelerations: absent  Uterine contractions: occasional     Assessment/Plan:  SVE: closed  Patient discharged with PTL precautions and s/s when to return to L&D and or call the office. Reassuring fetal status, no s/s of labor. Plan d/c home with office follow up.       Cayetano Thomas CNM  2023 5:24 PM

## 2023-01-28 LAB
APPEARANCE UR: ABNORMAL
BILIRUB UR QL: NEGATIVE
COLOR UR: ABNORMAL
GLUCOSE UR QL STRIP.AUTO: NEGATIVE MG/DL
KETONES UR-MCNC: NEGATIVE MG/DL
LEUKOCYTE ESTERASE UR QL STRIP: ABNORMAL
NITRITE UR QL: NEGATIVE
PH UR: 7 (ref 5–9)
PROT UR QL: NEGATIVE MG/DL
RBC # UR STRIP: ABNORMAL
SERVICE CMNT-IMP: ABNORMAL
SP GR UR: 1.02 (ref 1–1.02)
UROBILINOGEN UR QL: 0.2 EU/DL (ref 0.2–1)

## 2023-01-31 ENCOUNTER — HOSPITAL ENCOUNTER (OUTPATIENT)
Dept: PHYSICAL THERAPY | Age: 23
Discharge: HOME | End: 2023-01-31
Payer: OTHER GOVERNMENT

## 2023-01-31 PROCEDURE — 97110 THERAPEUTIC EXERCISES: CPT

## 2023-01-31 PROCEDURE — 97530 THERAPEUTIC ACTIVITIES: CPT

## 2023-01-31 PROCEDURE — 97112 NEUROMUSCULAR REEDUCATION: CPT

## 2023-01-31 NOTE — PROGRESS NOTES
PT DAILY TREATMENT NOTE    Patient Name: Ressie Kawasaki  Date:2023   : 2000  [x]  Patient  Verified  Payor:  / Plan: Deepak Colon 74 / Product Type:  /    In time:1:34pm  Out time:2:16pm  Total Treatment Time (min): 42  Total Timed Codes (min): 42  1:1 Treatment Time (MC/BCBS only): NA   Visit #: 12 of 15    Treatment Dx: Other low back pain [M54.59]    SUBJECTIVE  Pain Level (0-10 scale): 0  Any medication changes, allergies to medications, adverse drug reactions, diagnosis change, or new procedure performed?: [x] No    [] Yes (see summary sheet for update)  Subjective functional status/changes:   [] No changes reported  Pt reports that she went to hospital again d/t cramping and worried she was experiencing pre-term labor. They sent her home but pt states she was told baby is positioned for birth; worried about pre-term labor.       OBJECTIVE    23 min Therapeutic Exercise:  [x] See flow sheet :   Rationale: increase ROM, increase strength, improve coordination, improve balance, and increase proprioception to improve the patients ability to perform daily activities with decreased pain and symptom levels     10 min Therapeutic Activity:  [x]  See flow sheet : HEP review and POC/DC discussion   Rationale: increase ROM, increase strength, improve coordination, improve balance, and increase proprioception  to improve the patients ability to perform daily activities with decreased pain and symptom levels     9 min Neuromuscular Re-education:  [x]  See flow sheet : hip shifting with tactile and verbal cueing   Rationale: increase ROM, increase strength, improve coordination, improve balance, and increase proprioception  to improve the patients ability to perform daily activities with decreased pain and symptom levels          With   [] TE   [] TA   [] neuro   [] other: Patient Education: [x] Review HEP    [] Progressed/Changed HEP based on:   [] positioning   [] body mechanics [] transfers   [] heat/ice application    [] other:      Other Objective/Functional Measures: increased APT. Pt denies cramping throughout session, slight compensation at heels with narrow stance TRX squat    Pain Level (0-10 scale) post treatment: 0    ASSESSMENT/Changes in Function: Progressed activities to improve ankle mobility for improved shock absorption with gait and ADLs to reduce excess strain on pelvis. Pt was challenged by [facility] DF vs wall; able to anteriorly translate knee 2'' beyond toes bilat before demonstrating compensation. Demos improvement in squat depth and mechanics without any inc pain in pelvis or lumbar spine; removed heel elevation today     Patient will continue to benefit from skilled PT services to modify and progress therapeutic interventions, address functional mobility deficits, address ROM deficits, address strength deficits, analyze and address soft tissue restrictions, analyze and cue movement patterns, analyze and modify body mechanics/ergonomics, assess and modify postural abnormalities, and address imbalance/dizziness to attain remaining goals. [x]  See Plan of Care  []  See progress note/recertification  []  See Discharge Summary         Progress towards goals / Updated goals:  Short Term Goals: To be accomplished in 3 weeks :  Patient will be independent and compliant with HEP to progress toward goals and restore functional mobility. Eval Status: issued at eval  PN Status 1/12/23: pt voices doing HEP every other day progressing     Patient will demonstrate consistent innominate symmetry for less pain during transfers at work.    Eval Status: Innominate upslip on left  PN Status 1/12/23: pt demos progress with hip shifting activities indicating improvement in pelvic innominate symmetry progressing  Status 1/19/23: continued activities to improve coordination of pelvic innominates with lunges and split squats plus use of external supports and tactile cues to facilitate movement with reduced compensation     Patient will improve pain in hip, lumbar spine, and SIJ to some instances of 0/10  to improve walking tolerance and restore prior level of function. Eval Status: Best: 2-3/10 during rest, first thing in the morning  PN Status 1/12/23: 5/10 worst pain, 0/10 at best progressing  Status 1/31/23: pt denies orthopedic pain over past week     Long Term Goals: To be accomplished in 6 weeks:  Patient will improve FOTO score by 23 points (to 74 points) to improve functional tolerance for working tolerance. Eval Status: FOTO 51  PN Status 1/12/23: (measured on 12/29/22) 72 progressing  FOTO score = an established functional score where 100 = no disability  Current: 12/15/22: to be assessed at next visit due to time constraints     2. Pt will have painfree hip AROM to aid in functional mechanics for ambulation/ADLs. Eval Status: Hip ROM: WNL, hip pain and end range IR>ER, Righ>left; Flexion past 90 degrees provokes SIJ symptoms  Current: MET12/15/22: Patinet demonstrates full hip flexion ROM within allowance of pregrnancy and full hip IR/ER without discomfort. Reports no functional limitations     3. Pt will have 4+/5 hip ER/IR strength to return to goals of exercising through pregnancy without pain. Eval Status:     L(0-5) R (0-5)   Hip ER- seated 4 pain 3+ pain   Hip IR- seated 4 pain 3+ pain       PN Status 1/12/23: unchanged    L(0-5) R (0-5)   Hip ER- seated 4+ 4-   Hip IR- seated 4+ 4-    Status 1/17/23: progressed activities to improve hip IR AROM and strength in closed chain positions to aid in functional mechanics for transfers     4. Patient will improve pain in hip, lumbar spine, and SIJ  to 2-3/10 at worst to improve work tolerance and quality and restore prior level of function. Eval Status: Worst: 9/10 during nighttime, trying to fall asleep/10 at worst  PN Status 1/12/23: 5/10 worst pain, 0/10 at best progressing     5.    Pt will have negative and symmetrical ASLR to improve ability to bend over for ADLs. Eval Status: ASLR: Active Straight Leg Raise Test   Right: Effortful to raise leg> left, Less pain with external stabilization, + test  Left:  Less pain with external stabilization, + test  Current: MET 12/15/22: same bilaterally with and without external support      6. Patient will have appropriate knowledge of pelvic floor, TA, and urinary/bowel/pelvic pain symptoms for appropriate self care pre-natally and post-natally for independent management and referral if necessary.               Eval status: patient unaware of normal functioning of pelvic floor, TA, and possibly symptoms on genitourinary systems pre- and post-natally  PN Status 1/12/23: pt demos increased awareness of body and lumbopelvic position for squatting, lunging, and other functional mobility but cont to require mod cues to limit compensation patterns on right LE progressing         PLAN  [x]  Upgrade activities as tolerated     [x]  Continue plan of care  [x]  Update interventions per flow sheet       []  Discharge due to:_  []  Other:_      Rio Salmeron PT 1/31/2023  1:39 PM    Future Appointments   Date Time Provider Angelo Deal   2/7/2023  1:30 PM Rickcarin Simons, PT [facility]   2/14/2023  1:30 PM Rickcarin Adamesper, PT [facility]   2/21/2023  1:30 PM Rickcarin Adamesper, PT [facility]   2/28/2023  1:30 PM Rick Simons, PT [facility]

## 2023-02-07 ENCOUNTER — APPOINTMENT (OUTPATIENT)
Dept: PHYSICAL THERAPY | Age: 23
End: 2023-02-07

## 2023-02-14 ENCOUNTER — HOSPITAL ENCOUNTER (OUTPATIENT)
Facility: HOSPITAL | Age: 23
Setting detail: RECURRING SERIES
Discharge: HOME | End: 2023-02-17
Payer: OTHER GOVERNMENT

## 2023-02-14 ENCOUNTER — APPOINTMENT (OUTPATIENT)
Dept: PHYSICAL THERAPY | Age: 23
End: 2023-02-14

## 2023-02-14 PROCEDURE — 98960 EDU&TRN PT SELF-MGMT NQHP 1: CPT

## 2023-02-14 PROCEDURE — 97530 THERAPEUTIC ACTIVITIES: CPT

## 2023-02-14 PROCEDURE — 97112 NEUROMUSCULAR REEDUCATION: CPT

## 2023-02-14 PROCEDURE — 97110 THERAPEUTIC EXERCISES: CPT

## 2023-02-14 NOTE — PROGRESS NOTES
PHYSICAL / OCCUPATIONAL THERAPY - DAILY TREATMENT NOTE (updated )    Patient Name: Oneyda Mills    Date: 2023    : 2000  Insurance: Payor: [employer] / Plan: [employer]  / Product Type: *No Product type* /      Patient  verified Yes     Visit #   Current / Total 13 15   Time   In / Out 1:30pm 2:30pm   Pain   In / Out 0 0   Subjective Functional Status/Changes: Pt reports that she has a little back pain but attributes it to pregnancy. Due 3/10/23 but \"just waiting to give birth at this point. \" Still having sigrid bhandari contractions. Reports blood pressure and blood glucose are now managed. Overall reports general lumbopelvic discomfort that is limiting her ability to stand, sleep, get up and down at work. Changes to:  Meds, Allergies, Med Hx, Sx Hx? If yes, update Summary List no       TREATMENT AREA =  No admission diagnoses are documented for this encounter. OBJECTIVE         Therapeutic Procedures: Tx Min Billable or 1:1 Min (if diff from Tx Min) Procedure, Rationale, Specifics   10  60673 Therapeutic Exercise (timed):  increase ROM, strength, coordination, balance, and proprioception to improve patient's ability to progress to PLOF and address remaining functional goals. (see flow sheet as applicable)     Details if applicable:  isometrics and positional breathing     15  65346 Neuromuscular Re-Education (timed):  improve balance, coordination, kinesthetic sense, posture, core stability and proprioception to improve patient's ability to develop conscious control of individual muscles and awareness of position of extremities in order to progress to PLOF and address remaining functional goals.  (see flow sheet as applicable)     Details if applicable:     25  28553 Therapeutic Activity (timed):  use of dynamic activities replicating functional movements to increase ROM, strength, coordination, balance, and proprioception in order to improve patient's ability to progress to PLOF and address remaining functional goals. (see flow sheet as applicable)     Details if applicable:     10  38918 Self Care/Home Management (timed):  improve patient knowledge and understanding of pain reducing techniques, positioning, posture/ergonomics, diagnosis/prognosis, and physical therapy expectations, procedures and progression  to improve patient's ability to progress to PLOF and address remaining functional goals. (see flow sheet as applicable)     Details if applicable:  sleep positioning with demo and trial of various positional supports          Details if applicable:     61  [facility] Totals Reminder: bill using total billable min of TIMED therapeutic procedures (example: do not include dry needle or estim unattended, both untimed codes, in totals to left)  8-22 min = 1 unit; 23-37 min = 2 units; 38-52 min = 3 units; 53-67 min = 4 units; 68-82 min = 5 units   Total Total     [x]  Patient Education billed concurrently with other procedures   [x] Review HEP    [] Progressed/Changed HEP, detail:    [] Other detail:       Objective Information/Functional Measures/Assessment  Pt is making good progress in PT to address LBP and pelvic girdle pain with pregnancy but presents with exacerbation of sxs in past 3 days limiting tolerance for sleeping and work activities. Voices subjective improvements in orthopedic pain until last few days. Demos objective improvements in hip mobility and body awareness. See updated goals for further details. Pt responded well to sidelying positional breathing with SA reach and low reach to improve diaphragmatic breathing and airway opening with sleep. Educated on sidelying positional supports. Responded well to gentle isometrics for pelvic girdle. Pt cont to be limited by remaining pain and recent onset of increased pain; nearing due date 3/10/23. Reviewed and revised HEP to better address remaining impairments.  Plan to DC at next visit if pt able to manage pain exacerbation with new recommendations. Patient will continue to benefit from skilled PT / OT services to modify and progress therapeutic interventions, analyze and address functional mobility deficits, analyze and address ROM deficits, analyze and address strength deficits, analyze and address soft tissue restrictions, analyze and cue for proper movement patterns, analyze and modify for postural abnormalities, analyze and address imbalance/dizziness, and instruct in home and community integration to address functional deficits and attain remaining goals. Progress toward goals / Updated goals:  [x]  See Progress Note/Recertification    Short Term Goals: To be accomplished in 3 weeks :  Patient will be independent and compliant with HEP to progress toward goals and restore functional mobility. Eval Status: issued at eval  PN Status 1/12/23: pt voices doing HEP every other day progressing  PN Status 2/14/23: modified to better address remaining impairments; good compliance progressing     Patient will demonstrate consistent innominate symmetry for less pain during transfers at work. Eval Status: Innominate upslip on left  PN Status 1/12/23: pt demos progress with hip shifting activities indicating improvement in pelvic innominate symmetry progressing  PN Status 2/14/23: evolving pelvic positioning d/t third trimester of pregnancy; progressed activities to improve pelvic stabilization with isometrics progressing/evolving     Patient will improve pain in hip, lumbar spine, and SIJ to some instances of 0/10  to improve walking tolerance and restore prior level of function.   Eval Status: Best: 2-3/10 during rest, first thing in the morning  PN Status 1/12/23: 5/10 worst pain, 0/10 at best progressing  PN Status 2/14/23: orthopedic pain well controlled until past 2-3 days; now having increased discomfort around trunk and pelvis with work activities and sleeping; edu re: sleep positioning strategies and pt voiced pain reduction during demo progressing/evolving     Long Term Goals: To be accomplished in 6 weeks:  Patient will improve FOTO score by 23 points (to 74 points) to improve functional tolerance for working tolerance. Eval Status: FOTO 51  PN Status 1/12/23: (measured on 12/29/22) 72 progressing  PN Status 2/14/23: 58 regression   FOTO score = an established functional score where 100 = no disability  Current: 12/15/22: to be assessed at next visit due to time constraints     2. Pt will have painfree hip AROM to aid in functional mechanics for ambulation/ADLs. Eval Status: Hip ROM: WNL, hip pain and end range IR>ER, Righ>left; Flexion past 90 degrees provokes SIJ symptoms  Current: MET12/15/22: Patinet demonstrates full hip flexion ROM within allowance of pregrnancy and full hip IR/ER without discomfort. Reports no functional limitations     3. Pt will have 4+/5 hip ER/IR strength to return to goals of exercising through pregnancy without pain. Eval Status:     L(0-5) R (0-5)   Hip ER- seated 4 pain 3+ pain   Hip IR- seated 4 pain 3+ pain       PN Status 1/12/23: unchanged    L(0-5) R (0-5)   Hip ER- seated 4+ 4-   Hip IR- seated 4+ 4-   PN Status 2/14/23: progressed isometric hip strengthening to reduce pain around pelvic girdle; not formally assessed      4. Patient will improve pain in hip, lumbar spine, and SIJ  to 2-3/10 at worst to improve work tolerance and quality and restore prior level of function. Eval Status: Worst: 9/10 during nighttime, trying to fall asleep/10 at worst  PN Status 1/12/23: 5/10 worst pain, 0/10 at best progressing  PN Status 2/14/23: orthopedic pain well controlled until past 2-3 days; now having increased discomfort around trunk and pelvis with work activities and sleeping; edu re: sleep positioning strategies and pt voiced pain reduction during demo progressing/evolving     5. Pt will have negative and symmetrical ASLR to improve ability to bend over for ADLs.   Eval Status: ASLR: Active Straight Leg Raise Test   Right: Effortful to raise leg> left, Less pain with external stabilization, + test  Left:  Less pain with external stabilization, + test  Current: MET 12/15/22: same bilaterally with and without external support      6. Patient will have appropriate knowledge of pelvic floor, TA, and urinary/bowel/pelvic pain symptoms for appropriate self care pre-natally and post-natally for independent management and referral if necessary.               Eval status: patient unaware of normal functioning of pelvic floor, TA, and possibly symptoms on genitourinary systems pre- and post-natally  PN Status 1/12/23: pt demos increased awareness of body and lumbopelvic position for squatting, lunging, and other functional mobility but cont to require mod cues to limit compensation patterns on right LE progressing  PN Status 2/14/23: progressed activities to improve breathing mechanics in sidelying for reduced lumbopelvic girdle strain with sleeping progressing      PLAN  Yes  Continue plan of care  []  Upgrade activities as tolerated  []  Discharge due to :  []  Other:    Mabel Figueroa PT    2/14/2023    1:34 PM    Future Appointments   Date Time Provider Heather Alejo   2/21/2023  1:30 PM Thomas Juarez PT [facility]   2/28/2023  1:30 PM ANAHY Gray [facility]

## 2023-02-14 NOTE — PROGRESS NOTES
[facility] at the [address], [place], [address] [facility]  Phone: [phone]      Fax:  [phone]    Progress Note    Patient name: Giselle Tabor Start of Care: 2022   Referral source: Damion Bianchi MD : 2000               Medical Diagnosis: Other low back pain [M54.59]    Onset Date:22               Treatment Diagnosis: Other low back pain [M54.59]   Prior Hospitalization: see medical history Provider#: [id number]   Medications: Verified on Patient summary List    Comorbidities: 24 weeks pregnant with first child at eval   Prior Level of Function: functionally independent, no modifications, active lifestyle, works mainly while sitting but consistently up and down    Visits from Start of Care: 13    Missed Visits: 0    Updated Goals/Measure of Progress: To be achieved in 2-3 weeks:    Short Term Goals: To be accomplished in 3 weeks :  Patient will be independent and compliant with HEP to progress toward goals and restore functional mobility. Eval Status: issued at eval  PN Status 23: pt voices doing HEP every other day progressing  PN Status 23: modified to better address remaining impairments; good compliance progressing     Patient will demonstrate consistent innominate symmetry for less pain during transfers at work. Eval Status: Innominate upslip on left  PN Status 23: pt demos progress with hip shifting activities indicating improvement in pelvic innominate symmetry progressing  PN Status 23: evolving pelvic positioning d/t third trimester of pregnancy; progressed activities to improve pelvic stabilization with isometrics progressing/evolving     Patient will improve pain in hip, lumbar spine, and SIJ to some instances of 0/10  to improve walking tolerance and restore prior level of function.   Eval Status: Best: 2-3/10 during rest, first thing in the morning  PN Status 23: 5/10 worst pain, 0/10 at best progressing  PN Status 2/14/23: orthopedic pain well controlled until past 2-3 days; now having increased discomfort around trunk and pelvis with work activities and sleeping; edu re: sleep positioning strategies and pt voiced pain reduction during demo progressing/evolving     Long Term Goals: To be accomplished in 6 weeks:  Patient will improve FOTO score by 23 points (to 74 points) to improve functional tolerance for working tolerance. Eval Status: FOTO 51  PN Status 1/12/23: (measured on 12/29/22) 72 progressing  PN Status 2/14/23: 58 regression   FOTO score = an established functional score where 100 = no disability  Current: 12/15/22: to be assessed at next visit due to time constraints     2. Pt will have painfree hip AROM to aid in functional mechanics for ambulation/ADLs. Eval Status: Hip ROM: WNL, hip pain and end range IR>ER, Righ>left; Flexion past 90 degrees provokes SIJ symptoms  Current: MET12/15/22: Patinet demonstrates full hip flexion ROM within allowance of pregrnancy and full hip IR/ER without discomfort. Reports no functional limitations     3. Pt will have 4+/5 hip ER/IR strength to return to goals of exercising through pregnancy without pain. Eval Status:     L(0-5) R (0-5)   Hip ER- seated 4 pain 3+ pain   Hip IR- seated 4 pain 3+ pain       PN Status 1/12/23: unchanged    L(0-5) R (0-5)   Hip ER- seated 4+ 4-   Hip IR- seated 4+ 4-   PN Status 2/14/23: progressed isometric hip strengthening to reduce pain around pelvic girdle; not formally assessed      4. Patient will improve pain in hip, lumbar spine, and SIJ  to 2-3/10 at worst to improve work tolerance and quality and restore prior level of function. Eval Status:  Worst: 9/10 during nighttime, trying to fall asleep/10 at worst  PN Status 1/12/23: 5/10 worst pain, 0/10 at best progressing  PN Status 2/14/23: orthopedic pain well controlled until past 2-3 days; now having increased discomfort around trunk and pelvis with work activities and sleeping; edu re: sleep positioning strategies and pt voiced pain reduction during demo progressing/evolving     5. Pt will have negative and symmetrical ASLR to improve ability to bend over for ADLs. Eval Status: ASLR: Active Straight Leg Raise Test   Right: Effortful to raise leg> left, Less pain with external stabilization, + test  Left:  Less pain with external stabilization, + test  Current: MET 12/15/22: same bilaterally with and without external support      6. Patient will have appropriate knowledge of pelvic floor, TA, and urinary/bowel/pelvic pain symptoms for appropriate self care pre-natally and post-natally for independent management and referral if necessary. Eval status: patient unaware of normal functioning of pelvic floor, TA, and possibly symptoms on genitourinary systems pre- and post-natally  PN Status 1/12/23: pt demos increased awareness of body and lumbopelvic position for squatting, lunging, and other functional mobility but cont to require mod cues to limit compensation patterns on right LE progressing  PN Status 2/14/23: progressed activities to improve breathing mechanics in sidelying for reduced lumbopelvic girdle strain with sleeping progressing        Summary of Care/ Key Functional Changes: Pt is making good progress in PT to address LBP and pelvic girdle pain with pregnancy but presents with exacerbation of sxs in past 3 days limiting tolerance for sleeping and work activities. Voices subjective improvements in orthopedic pain until last few days. Demos objective improvements in hip mobility and body awareness. See updated goals for further details. Pt responded well to sidelying positional breathing with SA reach and low reach to improve diaphragmatic breathing and airway opening with sleep. Educated on sidelying positional supports. Responded well to gentle isometrics for pelvic girdle.  Pt cont to be limited by remaining pain and recent onset of increased pain; nearing due date 3/10/23. Reviewed and revised HEP to better address remaining impairments. Plan to DC at next visit if pt able to manage pain exacerbation with new recommendations. Patient will continue to benefit from skilled PT / OT services to modify and progress therapeutic interventions, analyze and address functional mobility deficits, analyze and address ROM deficits, analyze and address strength deficits, analyze and address soft tissue restrictions, analyze and cue for proper movement patterns, analyze and modify for postural abnormalities, analyze and address imbalance/dizziness, and instruct in home and community integration to address functional deficits and attain remaining goals.       ASSESSMENT/RECOMMENDATIONS:    [x]Continue therapy per initial plan/protocol at a frequency of  1 x per week for 3 more weeks  []Continue therapy with the following recommended changes:_____________________      _____________________________________________________________________  []Discontinue therapy progressing towards or have reached established goals  []Discontinue therapy due to lack of appreciable progress towards goals  []Discontinue therapy due to lack of attendance or compliance  []Await Physician's recommendations/decisions regarding therapy  []Other:________________________________________________________________    Thank you for this referral.   Carl Yepez, PT 2/14/2023 11:16 AM

## 2023-02-21 ENCOUNTER — APPOINTMENT (OUTPATIENT)
Dept: PHYSICAL THERAPY | Age: 23
End: 2023-02-21

## 2023-02-21 ENCOUNTER — HOSPITAL ENCOUNTER (OUTPATIENT)
Facility: HOSPITAL | Age: 23
Setting detail: RECURRING SERIES
Discharge: HOME | End: 2023-02-24
Payer: OTHER GOVERNMENT

## 2023-02-21 PROCEDURE — 97530 THERAPEUTIC ACTIVITIES: CPT

## 2023-02-21 PROCEDURE — 97110 THERAPEUTIC EXERCISES: CPT

## 2023-02-21 PROCEDURE — 97112 NEUROMUSCULAR REEDUCATION: CPT

## 2023-02-21 NOTE — PROGRESS NOTES
PHYSICAL / OCCUPATIONAL THERAPY - DAILY TREATMENT NOTE (updated )    Patient Name: Jay Potter    Date: 2023    : 2000  Insurance: Payor:  EAST / Plan:  EAST  / Product Type: *No Product type* /      Patient  verified Yes     Visit #   Current / Total 14 15   Time   In / Out 1:35pm 2:19pm   Pain   In / Out 0 0   Subjective Functional Status/Changes: Pt reports no changes since last visit   Changes to:  Meds, Allergies, Med Hx, Sx Hx? If yes, update Summary List no       TREATMENT AREA =  No admission diagnoses are documented for this encounter. OBJECTIVE         Therapeutic Procedures: Tx Min Billable or 1:1 Min (if diff from Tx Min) Procedure, Rationale, Specifics   23  [id number] Therapeutic Exercise (timed):  increase ROM, strength, coordination, balance, and proprioception to improve patient's ability to progress to PLOF and address remaining functional goals. (see flow sheet as applicable)     Details if applicable:       8  82667 Neuromuscular Re-Education (timed):  improve balance, coordination, kinesthetic sense, posture, core stability and proprioception to improve patient's ability to develop conscious control of individual muscles and awareness of position of extremities in order to progress to PLOF and address remaining functional goals. (see flow sheet as applicable)     Details if applicable:     13  26240 Therapeutic Activity (timed):  use of dynamic activities replicating functional movements to increase ROM, strength, coordination, balance, and proprioception in order to improve patient's ability to progress to PLOF and address remaining functional goals.   (see flow sheet as applicable)     Details if applicable:            Details if applicable:            Details if applicable:     40  [facility] Totals Reminder: bill using total billable min of TIMED therapeutic procedures (example: do not include dry needle or estim unattended, both untimed codes, in totals to left)  8-22 min = 1 unit; 23-37 min = 2 units; 38-52 min = 3 units; 53-67 min = 4 units; 68-82 min = 5 units   Total Total     [x]  Patient Education billed concurrently with other procedures   [x] Review HEP    [] Progressed/Changed HEP, detail:    [] Other detail:       Objective Information/Functional Measures/Assessment     Pt has made good progress in PT to address LBP and pelvic girdle pain with pregnancy despite exacerbation of sxs last week limiting tolerance for sleeping and work activities. Overall since start of care, voices subjective improvements in orthopedic pain until last week; pain has improved since exacerbation but still somewhat limited with sleeping. She is scheduled for labor induction on 3/5/23 if she does not go into labor sooner; anticipate improvement in these sxs following delivery. Demos objective improvements in hip mobility, hip strength  and body awareness. See updated goals for further details. Pt appropriate for DC at this time d/t max potential reached prior to due date (induction scheduled 3/5/23.) Reviewed HEP and self management strategies and she return demos appropriately. Addressed questions for HEP post-partum and instructions for reinitiating pelvic PT around 6 week FU. Patient will continue to benefit from skilled PT / OT services to modify and progress therapeutic interventions, analyze and address functional mobility deficits, analyze and address ROM deficits, analyze and address strength deficits, analyze and address soft tissue restrictions, analyze and cue for proper movement patterns, analyze and modify for postural abnormalities, analyze and address imbalance/dizziness, and instruct in home and community integration to address functional deficits and attain remaining goals. Progress toward goals / Updated goals:  []  See Progress Note/Recertification    Short Term Goals:  To be accomplished in 3 weeks :  Patient will be independent and compliant with HEP to progress toward goals and restore functional mobility. Eval Status: issued at eval  PN Status 1/12/23: pt voices doing HEP every other day progressing  PN Status 2/14/23: modified to better address remaining impairments; good compliance progressing  PN Status 2/21/2023: MET - reviewed HEP to continue post DC and ensured independence        Patient will demonstrate consistent innominate symmetry for less pain during transfers at work. Eval Status: Innominate upslip on left  PN Status 1/12/23: pt demos progress with hip shifting activities indicating improvement in pelvic innominate symmetry progressing  PN Status 2/14/23: evolving pelvic positioning d/t third trimester of pregnancy; progressed activities to improve pelvic stabilization with isometrics progressing/evolving  PN Status 2/21/2023: Evolving - pt independent with HEP for gentle pelvic stabilization; evolving pelvic position due to pregnancy third trimester, due date in 1.5 weeks       Patient will improve pain in hip, lumbar spine, and SIJ to some instances of 0/10  to improve walking tolerance and restore prior level of function. Eval Status: Best: 2-3/10 during rest, first thing in the morning  PN Status 1/12/23: 5/10 worst pain, 0/10 at best progressing  PN Status 2/14/23: orthopedic pain well controlled until past 2-3 days; now having increased discomfort around trunk and pelvis with work activities and sleeping; edu re: sleep positioning strategies and pt voiced pain reduction during demo progressing/evolving  PN Status 2/21/2023: Progressing - pelvic pain well controlled with exception of sigrid bhandari contractions and pain with sleeping      Long Term Goals: To be accomplished in 6 weeks:  Patient will improve FOTO score by 23 points (to 74 points) to improve functional tolerance for working tolerance. Eval Status: FOTO 51  PN Status 1/12/23: (measured on 12/29/22) 72 progressing  PN Status 2/14/23: 58 regression   PN Status 2/21/2023:  No change since last PN - measured at last visit (see above)   FOTO score = an established functional score where 100 = no disability  Current: 12/15/22: to be assessed at next visit due to time constraints     2. Pt will have painfree hip AROM to aid in functional mechanics for ambulation/ADLs. Eval Status: Hip ROM: WNL, hip pain and end range IR>ER, Righ>left; Flexion past 90 degrees provokes SIJ symptoms  Current: MET12/15/22: Patinet demonstrates full hip flexion ROM within allowance of pregrnancy and full hip IR/ER without discomfort. Reports no functional limitations     3. Pt will have 4+/5 hip ER/IR strength to return to goals of exercising through pregnancy without pain. Eval Status:     L(0-5) R (0-5)   Hip ER- seated 4 pain 3+ pain   Hip IR- seated 4 pain 3+ pain       PN Status 1/12/23: unchanged    L(0-5) R (0-5)   Hip ER- seated 4+ 4-   Hip IR- seated 4+ 4-   PN Status 2/14/23: progressed isometric hip strengthening to reduce pain around pelvic girdle; not formally assessed   PN Status 2/21/2023: Progressing - 4+/5 on left. 4/5 on right. 4.   Patient will improve pain in hip, lumbar spine, and SIJ  to 2-3/10 at worst to improve work tolerance and quality and restore prior level of function. Eval Status: Worst: 9/10 during nighttime, trying to fall asleep/10 at worst  PN Status 1/12/23: 5/10 worst pain, 0/10 at best progressing  PN Status 2/14/23: orthopedic pain well controlled until past 2-3 days; now having increased discomfort around trunk and pelvis with work activities and sleeping; edu re: sleep positioning strategies and pt voiced pain reduction during demo progressing/evolving  PN Status 2/21/2023: Progressing - pelvic pain well controlled with exception of sigrid bhandari contractions and pain with sleeping      5. Pt will have negative and symmetrical ASLR to improve ability to bend over for ADLs.   Eval Status: ASLR: Active Straight Leg Raise Test   Right: Effortful to raise leg> left, Less pain with external stabilization, + test  Left:  Less pain with external stabilization, + test  Current: MET 12/15/22: same bilaterally with and without external support      6. Patient will have appropriate knowledge of pelvic floor, TA, and urinary/bowel/pelvic pain symptoms for appropriate self care pre-natally and post-natally for independent management and referral if necessary. Eval status: patient unaware of normal functioning of pelvic floor, TA, and possibly symptoms on genitourinary systems pre- and post-natally  PN Status 1/12/23: pt demos increased awareness of body and lumbopelvic position for squatting, lunging, and other functional mobility but cont to require mod cues to limit compensation patterns on right LE progressing  PN Status 2/14/23: progressed activities to improve breathing mechanics in sidelying for reduced lumbopelvic girdle strain with sleeping progressing  PN Status 2/21/2023: Progressing - reviewed HEP and self-management strategies for pain with sleeping, ADLs, work-related tasks and pt return demos appropriately.            PLAN  No, DC  Continue plan of care  []  Upgrade activities as tolerated  [x]  Discharge due to : max potential reached, due date next week  []  Other:    Suma Luther PT    2/21/2023    1:49 PM    Future Appointments   Date Time Provider [place] Alejo   2/28/2023  1:30 PM Suma Luther PT [facility]

## 2023-02-21 NOTE — PROGRESS NOTES
[facility] at the [address], [place], [address] [address]  Phone: [phone]      Fax:  [phone]            Discharge Summary    Patient name: Kieran Osorio Start of Care: 2022   Referral source: Mor Crespo MD : 2000               Medical Diagnosis: Other low back pain [M54.59]    Onset Date:22               Treatment Diagnosis: Other low back pain [M54.59]   Prior Hospitalization: see medical history Provider#: [id number]   Medications: Verified on Patient summary List    Comorbidities: 24 weeks pregnant with first child at eval   Prior Level of Function: functionally independent, no modifications, active lifestyle, works mainly while sitting but consistently up and down    Visits from Start of Care: 14    Missed Visits: 0    Reporting Period : 22 to 23    Goals/Measure of Progress:  Short Term Goals: To be accomplished in 3 weeks :  Patient will be independent and compliant with HEP to progress toward goals and restore functional mobility. Eval Status: issued at [facility]  PN Status 23: pt voices doing HEP every other day progressing  PN Status 23: modified to better address remaining impairments; good compliance progressing  PN Status 2023: MET - reviewed HEP to continue post DC and ensured independence         Patient will demonstrate consistent innominate symmetry for less pain during transfers at work.    Eval Status: Innominate upslip on left  PN Status 23: pt demos progress with hip shifting activities indicating improvement in pelvic innominate symmetry progressing  PN Status 23: evolving pelvic positioning d/t third trimester of pregnancy; progressed activities to improve pelvic stabilization with isometrics progressing/evolving  PN Status 2023: Evolving - pt independent with HEP for gentle pelvic stabilization; evolving pelvic position due to pregnancy third trimester, due date in 1.5 weeks        Patient will improve pain in hip, lumbar spine, and SIJ to some instances of 0/10  to improve walking tolerance and restore prior level of function. Eval Status: Best: 2-3/10 during rest, first thing in the morning  PN Status 1/12/23: 5/10 worst pain, 0/10 at best progressing  PN Status 2/14/23: orthopedic pain well controlled until past 2-3 days; now having increased discomfort around trunk and pelvis with work activities and sleeping; edu re: sleep positioning strategies and pt voiced pain reduction during demo progressing/evolving  PN Status 2/21/2023: Progressing - pelvic pain well controlled with exception of sigrid bhandari contractions and pain with sleeping      Long Term Goals: To be accomplished in 6 weeks:  Patient will improve FOTO score by 23 points (to 74 points) to improve functional tolerance for working tolerance. Eval Status: FOTO 51  PN Status 1/12/23: (measured on 12/29/22) 72 progressing  PN Status 2/14/23: 58 regression   PN Status 2/21/2023: No change since last PN - measured at last visit (see above)   FOTO score = an established functional score where 100 = no disability  Current: 12/15/22: to be assessed at next visit due to time constraints     2. Pt will have painfree hip AROM to aid in functional mechanics for ambulation/ADLs. Eval Status: Hip ROM: WNL, hip pain and end range IR>ER, Righ>left; Flexion past 90 degrees provokes SIJ symptoms  Current: MET12/15/22: Patinet demonstrates full hip flexion ROM within allowance of pregrnancy and full hip IR/ER without discomfort. Reports no functional limitations     3. Pt will have 4+/5 hip ER/IR strength to return to goals of exercising through pregnancy without pain.   Eval Status:     L(0-5) R (0-5)   Hip ER- seated 4 pain 3+ pain   Hip IR- seated 4 pain 3+ pain       PN Status 1/12/23: unchanged    L(0-5) R (0-5)   Hip ER- seated 4+ 4-   Hip IR- seated 4+ 4-   PN Status 2/14/23: progressed isometric hip strengthening to reduce pain around pelvic girdle; not formally assessed   PN Status 2/21/2023: Progressing - 4+/5 on left. 4/5 on right. 4.   Patient will improve pain in hip, lumbar spine, and SIJ  to 2-3/10 at worst to improve work tolerance and quality and restore prior level of function. Eval Status: Worst: 9/10 during nighttime, trying to fall asleep/10 at worst  PN Status 1/12/23: 5/10 worst pain, 0/10 at best progressing  PN Status 2/14/23: orthopedic pain well controlled until past 2-3 days; now having increased discomfort around trunk and pelvis with work activities and sleeping; edu re: sleep positioning strategies and pt voiced pain reduction during demo progressing/evolving  PN Status 2/21/2023: Progressing - pelvic pain well controlled with exception of sigrid bhandari contractions and pain with sleeping      5. Pt will have negative and symmetrical ASLR to improve ability to bend over for ADLs. Eval Status: ASLR: Active Straight Leg Raise Test   Right: Effortful to raise leg> left, Less pain with external stabilization, + test  Left:  Less pain with external stabilization, + test  Current: MET 12/15/22: same bilaterally with and without external support      6. Patient will have appropriate knowledge of pelvic floor, TA, and urinary/bowel/pelvic pain symptoms for appropriate self care pre-natally and post-natally for independent management and referral if necessary.               Eval status: patient unaware of normal functioning of pelvic floor, TA, and possibly symptoms on genitourinary systems pre- and post-natally  PN Status 1/12/23: pt demos increased awareness of body and lumbopelvic position for squatting, lunging, and other functional mobility but cont to require mod cues to limit compensation patterns on right LE progressing  PN Status 2/14/23: progressed activities to improve breathing mechanics in sidelying for reduced lumbopelvic girdle strain with sleeping progressing  PN Status 2/21/2023: Progressing - reviewed HEP and self-management strategies for pain with sleeping, ADLs, work-related tasks and pt return demos appropriately. Assessment/ Summary of Care: Pt has made good progress in PT to address LBP and pelvic girdle pain with pregnancy despite exacerbation of sxs last week limiting tolerance for sleeping and work activities. Overall since start of care, voices subjective improvements in orthopedic pain until last week; pain has improved since exacerbation but still somewhat limited with sleeping. She is scheduled for labor induction on 3/5/23 if she does not go into labor sooner; anticipate improvement in these sxs following delivery. Demos objective improvements in hip mobility, hip strength  and body awareness. See updated goals for further details. Pt appropriate for DC at this time d/t max potential reached prior to due date (induction scheduled 3/5/23.) Reviewed HEP and self management strategies and she return demos appropriately. Addressed questions for HEP post-partum and instructions for reinitiating pelvic PT around 6 week FU.       RECOMMENDATIONS:  [x]Discontinue therapy: [x]Patient has reached or is progressing toward set goals      []Patient is non-compliant or has abdicated      []Due to lack of appreciable progress towards set goals    [facility], PT 2/21/2023 1:36 PM

## 2023-02-28 ENCOUNTER — APPOINTMENT (OUTPATIENT)
Facility: HOSPITAL | Age: 23
End: 2023-02-28
Payer: OTHER GOVERNMENT

## 2023-02-28 ENCOUNTER — APPOINTMENT (OUTPATIENT)
Dept: PHYSICAL THERAPY | Age: 23
End: 2023-02-28

## 2023-03-05 ENCOUNTER — ANESTHESIA EVENT (OUTPATIENT)
Facility: HOSPITAL | Age: 23
End: 2023-03-05
Payer: OTHER GOVERNMENT

## 2023-03-05 ENCOUNTER — HOSPITAL ENCOUNTER (INPATIENT)
Facility: HOSPITAL | Age: 23
LOS: 2 days | Discharge: HOME | End: 2023-03-07
Attending: OBSTETRICS & GYNECOLOGY | Admitting: OBSTETRICS & GYNECOLOGY
Payer: OTHER GOVERNMENT

## 2023-03-05 ENCOUNTER — ANESTHESIA (OUTPATIENT)
Facility: HOSPITAL | Age: 23
End: 2023-03-05
Payer: OTHER GOVERNMENT

## 2023-03-05 LAB
ABO + RH BLD: NORMAL
BASOPHILS # BLD: 0 K/UL (ref 0–0.1)
BASOPHILS NFR BLD: 0 % (ref 0–2)
BLOOD GROUP ANTIBODIES SERPL: NORMAL
DIFFERENTIAL METHOD BLD: ABNORMAL
EOSINOPHIL # BLD: 0.1 K/UL (ref 0–0.4)
EOSINOPHIL NFR BLD: 1 % (ref 0–5)
ERYTHROCYTE [DISTWIDTH] IN BLOOD BY AUTOMATED COUNT: 12.8 % (ref 11.6–14.5)
GLUCOSE SERPL-MCNC: 93 MG/DL (ref 74–99)
HCT VFR BLD AUTO: 43.2 % (ref 35–45)
HGB BLD-MCNC: 14.7 G/DL (ref 12–16)
IMM GRANULOCYTES # BLD AUTO: 0.1 K/UL (ref 0–0.04)
IMM GRANULOCYTES NFR BLD AUTO: 1 % (ref 0–0.5)
LYMPHOCYTES # BLD: 1.7 K/UL (ref 0.9–3.6)
LYMPHOCYTES NFR BLD: 15 % (ref 21–52)
MCH RBC QN AUTO: 32.7 PG (ref 24–34)
MCHC RBC AUTO-ENTMCNC: 34 G/DL (ref 31–37)
MCV RBC AUTO: 96.2 FL (ref 78–100)
MONOCYTES # BLD: 0.6 K/UL (ref 0.05–1.2)
MONOCYTES NFR BLD: 5 % (ref 3–10)
NEUTS SEG # BLD: 8.6 K/UL (ref 1.8–8)
NEUTS SEG NFR BLD: 78 % (ref 40–73)
NRBC # BLD: 0 K/UL (ref 0–0.01)
NRBC BLD-RTO: 0 PER 100 WBC
PLATELET # BLD AUTO: 147 K/UL (ref 135–420)
PMV BLD AUTO: 13.3 FL (ref 9.2–11.8)
RBC # BLD AUTO: 4.49 M/UL (ref 4.2–5.3)
SPECIMEN EXP DATE BLD: NORMAL
WBC # BLD AUTO: 11 K/UL (ref 4.6–13.2)

## 2023-03-05 PROCEDURE — 82947 ASSAY GLUCOSE BLOOD QUANT: CPT

## 2023-03-05 PROCEDURE — 86900 BLOOD TYPING SEROLOGIC ABO: CPT

## 2023-03-05 PROCEDURE — 3700000025 EPIDURAL BLOCK: Performed by: ANESTHESIOLOGY

## 2023-03-05 PROCEDURE — 6360000002 HC RX W HCPCS

## 2023-03-05 PROCEDURE — 7210000100 HC LABOR FEE PER 1 HR

## 2023-03-05 PROCEDURE — 1100000000 HC RM PRIVATE

## 2023-03-05 PROCEDURE — 7100000000 HC PACU RECOVERY - FIRST 15 MIN

## 2023-03-05 PROCEDURE — 00HU33Z INSERTION OF INFUSION DEVICE INTO SPINAL CANAL, PERCUTANEOUS APPROACH: ICD-10-PCS | Performed by: OBSTETRICS & GYNECOLOGY

## 2023-03-05 PROCEDURE — 2580000003 HC RX 258: Performed by: REGISTERED NURSE

## 2023-03-05 PROCEDURE — 0UQMXZZ REPAIR VULVA, EXTERNAL APPROACH: ICD-10-PCS | Performed by: OBSTETRICS & GYNECOLOGY

## 2023-03-05 PROCEDURE — 85025 COMPLETE CBC W/AUTO DIFF WBC: CPT

## 2023-03-05 PROCEDURE — 2500000003 HC RX 250 WO HCPCS

## 2023-03-05 PROCEDURE — 6370000000 HC RX 637 (ALT 250 FOR IP)

## 2023-03-05 PROCEDURE — 6360000002 HC RX W HCPCS: Performed by: REGISTERED NURSE

## 2023-03-05 PROCEDURE — 7100000001 HC PACU RECOVERY - ADDTL 15 MIN

## 2023-03-05 PROCEDURE — 2500000003 HC RX 250 WO HCPCS: Performed by: REGISTERED NURSE

## 2023-03-05 PROCEDURE — 3E0R3BZ INTRODUCTION OF ANESTHETIC AGENT INTO SPINAL CANAL, PERCUTANEOUS APPROACH: ICD-10-PCS | Performed by: OBSTETRICS & GYNECOLOGY

## 2023-03-05 PROCEDURE — 36415 COLL VENOUS BLD VENIPUNCTURE: CPT

## 2023-03-05 PROCEDURE — 3700000156 HC EPIDURAL ANESTHESIA

## 2023-03-05 PROCEDURE — 7220000101 HC DELIVERY VAGINAL/SINGLE

## 2023-03-05 PROCEDURE — 2580000003 HC RX 258

## 2023-03-05 RX ORDER — ACETAMINOPHEN 500 MG
1000 TABLET ORAL EVERY 6 HOURS PRN
Status: DISCONTINUED | OUTPATIENT
Start: 2023-03-05 | End: 2023-03-06 | Stop reason: HOSPADM

## 2023-03-05 RX ORDER — SODIUM CHLORIDE 0.9 % (FLUSH) 0.9 %
5-40 SYRINGE (ML) INJECTION EVERY 12 HOURS SCHEDULED
Status: DISCONTINUED | OUTPATIENT
Start: 2023-03-05 | End: 2023-03-06 | Stop reason: HOSPADM

## 2023-03-05 RX ORDER — DOCUSATE SODIUM 100 MG/1
100 CAPSULE, LIQUID FILLED ORAL 2 TIMES DAILY PRN
Status: DISCONTINUED | OUTPATIENT
Start: 2023-03-05 | End: 2023-03-07 | Stop reason: HOSPADM

## 2023-03-05 RX ORDER — SODIUM CHLORIDE, SODIUM LACTATE, POTASSIUM CHLORIDE, AND CALCIUM CHLORIDE .6; .31; .03; .02 G/100ML; G/100ML; G/100ML; G/100ML
1000 INJECTION, SOLUTION INTRAVENOUS PRN
Status: DISCONTINUED | OUTPATIENT
Start: 2023-03-05 | End: 2023-03-06 | Stop reason: HOSPADM

## 2023-03-05 RX ORDER — SODIUM CHLORIDE 0.9 % (FLUSH) 0.9 %
5-40 SYRINGE (ML) INJECTION PRN
Status: DISCONTINUED | OUTPATIENT
Start: 2023-03-05 | End: 2023-03-06 | Stop reason: HOSPADM

## 2023-03-05 RX ORDER — CALCIUM CARBONATE 200(500)MG
500 TABLET,CHEWABLE ORAL 3 TIMES DAILY PRN
Status: DISCONTINUED | OUTPATIENT
Start: 2023-03-05 | End: 2023-03-07 | Stop reason: HOSPADM

## 2023-03-05 RX ORDER — SODIUM CHLORIDE, SODIUM LACTATE, POTASSIUM CHLORIDE, AND CALCIUM CHLORIDE .6; .31; .03; .02 G/100ML; G/100ML; G/100ML; G/100ML
500 INJECTION, SOLUTION INTRAVENOUS PRN
Status: DISCONTINUED | OUTPATIENT
Start: 2023-03-05 | End: 2023-03-06 | Stop reason: HOSPADM

## 2023-03-05 RX ORDER — CARBOPROST TROMETHAMINE 250 UG/ML
250 INJECTION, SOLUTION INTRAMUSCULAR PRN
Status: DISCONTINUED | OUTPATIENT
Start: 2023-03-05 | End: 2023-03-06 | Stop reason: HOSPADM

## 2023-03-05 RX ORDER — NALOXONE HYDROCHLORIDE 0.4 MG/ML
INJECTION, SOLUTION INTRAMUSCULAR; INTRAVENOUS; SUBCUTANEOUS PRN
Status: DISCONTINUED | OUTPATIENT
Start: 2023-03-05 | End: 2023-03-06 | Stop reason: HOSPADM

## 2023-03-05 RX ORDER — ACETAMINOPHEN 500 MG
1000 TABLET ORAL EVERY 8 HOURS PRN
Status: DISCONTINUED | OUTPATIENT
Start: 2023-03-05 | End: 2023-03-07 | Stop reason: HOSPADM

## 2023-03-05 RX ORDER — LIDOCAINE HYDROCHLORIDE 10 MG/ML
INJECTION, SOLUTION INFILTRATION; PERINEURAL PRN
Status: DISCONTINUED | OUTPATIENT
Start: 2023-03-05 | End: 2023-03-05 | Stop reason: SDUPTHER

## 2023-03-05 RX ORDER — TRANEXAMIC ACID 10 MG/ML
1000 INJECTION, SOLUTION INTRAVENOUS
Status: DISCONTINUED | OUTPATIENT
Start: 2023-03-05 | End: 2023-03-06 | Stop reason: HOSPADM

## 2023-03-05 RX ORDER — SODIUM CHLORIDE, SODIUM LACTATE, POTASSIUM CHLORIDE, CALCIUM CHLORIDE 600; 310; 30; 20 MG/100ML; MG/100ML; MG/100ML; MG/100ML
INJECTION, SOLUTION INTRAVENOUS CONTINUOUS
Status: DISCONTINUED | OUTPATIENT
Start: 2023-03-05 | End: 2023-03-06 | Stop reason: HOSPADM

## 2023-03-05 RX ORDER — SODIUM CHLORIDE 9 MG/ML
INJECTION, SOLUTION INTRAVENOUS PRN
Status: DISCONTINUED | OUTPATIENT
Start: 2023-03-05 | End: 2023-03-07 | Stop reason: HOSPADM

## 2023-03-05 RX ORDER — SODIUM CHLORIDE 9 MG/ML
25 INJECTION, SOLUTION INTRAVENOUS PRN
Status: DISCONTINUED | OUTPATIENT
Start: 2023-03-05 | End: 2023-03-06 | Stop reason: HOSPADM

## 2023-03-05 RX ORDER — CARBOPROST TROMETHAMINE 250 UG/ML
250 INJECTION, SOLUTION INTRAMUSCULAR PRN
OUTPATIENT
Start: 2023-03-05

## 2023-03-05 RX ORDER — ONDANSETRON 2 MG/ML
4 INJECTION INTRAMUSCULAR; INTRAVENOUS EVERY 6 HOURS PRN
Status: DISCONTINUED | OUTPATIENT
Start: 2023-03-05 | End: 2023-03-06 | Stop reason: HOSPADM

## 2023-03-05 RX ORDER — FENTANYL CITRATE 50 UG/ML
INJECTION, SOLUTION INTRAMUSCULAR; INTRAVENOUS PRN
Status: DISCONTINUED | OUTPATIENT
Start: 2023-03-05 | End: 2023-03-05 | Stop reason: SDUPTHER

## 2023-03-05 RX ORDER — LANOLIN/MINERAL OIL
LOTION (ML) TOPICAL PRN
Status: DISCONTINUED | OUTPATIENT
Start: 2023-03-05 | End: 2023-03-07 | Stop reason: HOSPADM

## 2023-03-05 RX ORDER — MISOPROSTOL 200 UG/1
800 TABLET ORAL PRN
OUTPATIENT
Start: 2023-03-05

## 2023-03-05 RX ORDER — MISOPROSTOL 200 UG/1
800 TABLET ORAL PRN
Status: DISCONTINUED | OUTPATIENT
Start: 2023-03-05 | End: 2023-03-06 | Stop reason: HOSPADM

## 2023-03-05 RX ORDER — IBUPROFEN 400 MG/1
800 TABLET ORAL EVERY 8 HOURS PRN
Status: DISCONTINUED | OUTPATIENT
Start: 2023-03-05 | End: 2023-03-07 | Stop reason: HOSPADM

## 2023-03-05 RX ORDER — SENNA AND DOCUSATE SODIUM 50; 8.6 MG/1; MG/1
1 TABLET, FILM COATED ORAL DAILY PRN
OUTPATIENT
Start: 2023-03-05

## 2023-03-05 RX ORDER — FENTANYL CITRATE 50 UG/ML
INJECTION, SOLUTION INTRAMUSCULAR; INTRAVENOUS
Status: COMPLETED
Start: 2023-03-05 | End: 2023-03-05

## 2023-03-05 RX ORDER — METHYLERGONOVINE MALEATE 0.2 MG/ML
200 INJECTION INTRAVENOUS PRN
OUTPATIENT
Start: 2023-03-05

## 2023-03-05 RX ORDER — SIMETHICONE 80 MG
80 TABLET,CHEWABLE ORAL EVERY 6 HOURS PRN
Status: DISCONTINUED | OUTPATIENT
Start: 2023-03-05 | End: 2023-03-07 | Stop reason: HOSPADM

## 2023-03-05 RX ORDER — LIDOCAINE HYDROCHLORIDE AND EPINEPHRINE 20; 5 MG/ML; UG/ML
INJECTION, SOLUTION EPIDURAL; INFILTRATION; INTRACAUDAL; PERINEURAL PRN
Status: DISCONTINUED | OUTPATIENT
Start: 2023-03-05 | End: 2023-03-05 | Stop reason: SDUPTHER

## 2023-03-05 RX ORDER — ONDANSETRON 2 MG/ML
4 INJECTION INTRAMUSCULAR; INTRAVENOUS EVERY 6 HOURS PRN
OUTPATIENT
Start: 2023-03-05

## 2023-03-05 RX ORDER — ROPIVACAINE HYDROCHLORIDE 2 MG/ML
INJECTION, SOLUTION EPIDURAL; INFILTRATION; PERINEURAL PRN
Status: DISCONTINUED | OUTPATIENT
Start: 2023-03-05 | End: 2023-03-05 | Stop reason: SDUPTHER

## 2023-03-05 RX ORDER — NALBUPHINE HCL 10 MG/ML
10 AMPUL (ML) INJECTION
Status: DISCONTINUED | OUTPATIENT
Start: 2023-03-05 | End: 2023-03-06 | Stop reason: HOSPADM

## 2023-03-05 RX ORDER — MISOPROSTOL 200 UG/1
600 TABLET ORAL PRN
OUTPATIENT
Start: 2023-03-05

## 2023-03-05 RX ORDER — MINERAL OIL
OIL (ML) MISCELLANEOUS ONCE
Status: DISCONTINUED | OUTPATIENT
Start: 2023-03-05 | End: 2023-03-07 | Stop reason: HOSPADM

## 2023-03-05 RX ORDER — METHYLERGONOVINE MALEATE 0.2 MG/ML
200 INJECTION INTRAVENOUS PRN
Status: DISCONTINUED | OUTPATIENT
Start: 2023-03-05 | End: 2023-03-06 | Stop reason: HOSPADM

## 2023-03-05 RX ORDER — SODIUM CHLORIDE 0.9 % (FLUSH) 0.9 %
5-40 SYRINGE (ML) INJECTION PRN
Status: DISCONTINUED | OUTPATIENT
Start: 2023-03-05 | End: 2023-03-07 | Stop reason: HOSPADM

## 2023-03-05 RX ORDER — SODIUM CHLORIDE 0.9 % (FLUSH) 0.9 %
5-40 SYRINGE (ML) INJECTION EVERY 12 HOURS SCHEDULED
Status: DISCONTINUED | OUTPATIENT
Start: 2023-03-06 | End: 2023-03-07 | Stop reason: HOSPADM

## 2023-03-05 RX ORDER — DOCUSATE SODIUM 100 MG/1
100 CAPSULE, LIQUID FILLED ORAL 2 TIMES DAILY PRN
Status: DISCONTINUED | OUTPATIENT
Start: 2023-03-05 | End: 2023-03-06 | Stop reason: HOSPADM

## 2023-03-05 RX ADMIN — ROPIVACAINE HYDROCHLORIDE 8 ML: 2 INJECTION EPIDURAL; INFILTRATION; PERINEURAL at 20:17

## 2023-03-05 RX ADMIN — SODIUM CHLORIDE, POTASSIUM CHLORIDE, SODIUM LACTATE AND CALCIUM CHLORIDE: 600; 310; 30; 20 INJECTION, SOLUTION INTRAVENOUS at 15:27

## 2023-03-05 RX ADMIN — FENTANYL CITRATE 12 ML/HR: 0.05 INJECTION, SOLUTION INTRAMUSCULAR; INTRAVENOUS at 17:46

## 2023-03-05 RX ADMIN — LIDOCAINE HYDROCHLORIDE 30 ML: 10 INJECTION, SOLUTION EPIDURAL; INFILTRATION; INTRACAUDAL; PERINEURAL at 21:44

## 2023-03-05 RX ADMIN — ACETAMINOPHEN 1000 MG: 500 TABLET ORAL at 22:25

## 2023-03-05 RX ADMIN — ROPIVACAINE HYDROCHLORIDE 8 ML: 2 INJECTION EPIDURAL; INFILTRATION; PERINEURAL at 17:37

## 2023-03-05 RX ADMIN — FENTANYL CITRATE 100 MCG: 50 INJECTION, SOLUTION INTRAMUSCULAR; INTRAVENOUS at 17:37

## 2023-03-05 RX ADMIN — SODIUM CHLORIDE 5 MILLION UNITS: 900 INJECTION INTRAVENOUS at 15:31

## 2023-03-05 RX ADMIN — LIDOCAINE HYDROCHLORIDE 6 ML: 10 INJECTION, SOLUTION INFILTRATION; PERINEURAL at 17:23

## 2023-03-05 RX ADMIN — SODIUM CHLORIDE 2.5 MILLION UNITS: 9 INJECTION, SOLUTION INTRAVENOUS at 19:13

## 2023-03-05 RX ADMIN — MISOPROSTOL 800 MCG: 200 TABLET ORAL at 21:37

## 2023-03-05 RX ADMIN — FENTANYL CITRATE 100 MCG: 50 INJECTION, SOLUTION INTRAMUSCULAR; INTRAVENOUS at 17:38

## 2023-03-05 RX ADMIN — ONDANSETRON 4 MG: 2 INJECTION INTRAMUSCULAR; INTRAVENOUS at 17:12

## 2023-03-05 RX ADMIN — LIDOCAINE HYDROCHLORIDE,EPINEPHRINE BITARTRATE 3 ML: 20; .005 INJECTION, SOLUTION EPIDURAL; INFILTRATION; INTRACAUDAL; PERINEURAL at 17:37

## 2023-03-05 ASSESSMENT — PAIN DESCRIPTION - LOCATION: LOCATION: ABDOMEN

## 2023-03-05 ASSESSMENT — PAIN SCALES - GENERAL: PAINLEVEL_OUTOF10: 10

## 2023-03-05 NOTE — PLAN OF CARE
Problem: Vaginal Birth or  Section  Goal: Fetal and maternal status remain reassuring during the birth process  Description:  Birth OB-Pregnancy care plan goal which identifies if the fetal and maternal status remain reassuring during the birth process  Outcome: Progressing     Problem: Pain  Goal: Verbalizes/displays adequate comfort level or baseline comfort level  Outcome: Progressing     Problem: Infection - Adult  Goal: Absence of infection during hospitalization  Outcome: Progressing     Problem: Infection - Adult  Goal: Absence of fever/infection during anticipated neutropenic period  Outcome: Progressing     Problem: Safety - Adult  Goal: Free from fall injury  Outcome: Progressing     Problem: Discharge Planning  Goal: Discharge to home or other facility with appropriate resources  Outcome: Progressing

## 2023-03-05 NOTE — H&P
History & Physical    Name: Isabelle Nettles MRN: [id number]  SSN: [id number]    YOB: 2000  Age: 20 y.o. Sex: female        Subjective:     Estimated Date of Delivery: 3/10/23  OB History    Para Term  AB Living   1 0 0     0   SAB IAB Ectopic Molar Multiple Live Births             0      # Outcome Date GA Lbr Wilson/2nd Weight Sex Delivery Anes PTL Lv   1 Current                Ms. Christelle Meneses is admitted with pregnancy at 39w2d for induction of labor. Prenatal course was complicated by diabetes - gestational and thrombocytopenia . Please see prenatal records for details. Past Medical History:   Diagnosis Date    Anemia     Gestational diabetes      Past Surgical History:   Procedure Laterality Date    OTHER SURGICAL HISTORY      [place] teeth extraction     Social History     Occupational History    Not on file   Tobacco Use    Smoking status: Never    Smokeless tobacco: Never   Substance and Sexual Activity    Alcohol use: Not Currently    Drug use: Never    Sexual activity: Not on file     No family history on file. No Known Allergies  Prior to Admission medications    Not on File        Review of Systems: Pertinent items are noted in HPI. Objective:     Vitals:  Vitals:    23 1418   BP: 136/87   Pulse: (!) 103   Resp: 16   Temp: 97.6 °F (36.4 °C)   TempSrc: Oral   SpO2: 99%        Physical Exam:  Cervical exam:  Cervix:  4 cm  Effacement: 90%   Station: 0  Presentation:  Vertex   Membranes:  Intact    Fetal Heart Rate: Baseline: 140 per minute  Variability: moderate  Accelerations: yes  Decelerations: none  Contractions: Frequency: Every 2-6 minutes   Cat I FHR tracing    Prenatal Labs:   No results found for: ABORH, RUBELLAEXT, GRBSEXT, HBSAGEXT, HIVEXT, RPREXT, GONNOEXT, CHLAMEXT      Assessment/Plan:     Principal Problem:    Normal IUP (intrauterine pregnancy) on prenatal ultrasound, third trimester  Resolved Problems:    * No resolved hospital problems.  *       Plan: Admit to labor and delivery  Group B Strep was positive, will treat prophylactically with penicillin.   Labs as ordered  PPH class 0  IV - LR  Pitocin augmentation PRN per protocol  Continuous  fetal monitoring  Anesthesia consult for MACIEL if desires  Anticipate vaginal delivery  CS for maternal/fetal indications  Dr. Melissa Guidry aware of admission and POC       Signed By:  SHELTON Vann CNM     March 5, 2023

## 2023-03-05 NOTE — ANESTHESIA PRE PROCEDURE
Department of Anesthesiology  Preprocedure Note       Name:  Grace Vargas   Age:  20 y.o.  :  2000                                          MRN:  [id number]         Date:  3/5/2023      Surgeon: * No surgeons listed *    Procedure: * No procedures listed *    Medications prior to admission:   Prior to Admission medications    Not on File       Current medications:    Current Facility-Administered Medications   Medication Dose Route Frequency Provider Last Rate Last Admin    lactated ringers IV soln infusion   IntraVENous Continuous Roger Lout, APRN -  mL/hr at 23 1527 New Bag at 23 1527    lactated ringers bolus  500 mL IntraVENous PRN Ayala Junk Coxson, APRN - CNM        Or    lactated ringers bolus  1,000 mL IntraVENous PRN Roger Lout, APRN - CNM        sodium chloride flush 0.9 % injection 5-40 mL  5-40 mL IntraVENous 2 times per day Roger Lout, APRN - CNM        sodium chloride flush 0.9 % injection 5-40 mL  5-40 mL IntraVENous PRN Ayala Junk Coxson, APRN - CNM        0.9 % sodium chloride infusion  25 mL IntraVENous PRN Ayala Junk Coxson, APRN - CNM        oxytocin (PITOCIN) 30 units in 500 mL infusion  1-20 kenny-units/min IntraVENous Continuous Ayala Junk Coxson, APRN - CNM        methylergonovine (METHERGINE) injection 200 mcg  200 mcg IntraMUSCular PRN Roger Lout, APRN - CNM        carboprost (HEMABATE) injection 250 mcg  250 mcg IntraMUSCular PRN Ayala Junk Coxson, APRN - CNM        miSOPROStol (CYTOTEC) tablet 800 mcg  800 mcg Rectal PRN Ayala Junk Coxson, APRN - CNM        tranexamic acid-NaCl IVPB premix 1,000 mg  1,000 mg IntraVENous Once PRN Ayala Junk Coxson, APRN - CNM        oxytocin (PITOCIN) 30 units in 500 mL infusion  87.3 kenny-units/min IntraVENous Continuous PRN Ayala Junk Coxson, APRN - CNM        And    oxytocin (PITOCIN) 10 unit bolus from the bag  10 Units IntraVENous PRN Ayala Junk Coxson, APRN - CNM        nalbuphine (NUBAIN) injection 10 mg  10 mg IntraVENous Q2H PRN Roylene Simmering Coxson, APRN - CNM        butorphanol (STADOL) injection 2 mg  2 mg IntraVENous Q3H PRN Roylene Simmering Coxson, APRN - CNM        famotidine (PEPCID) 20 mg in sodium chloride (PF) 0.9 % 10 mL injection  20 mg IntraVENous Q12H PRN Roylene Simmering Coxson, APRN - CNM        ondansetron [facility]) injection 4 mg  4 mg IntraVENous Q6H PRN Roylene Simmering Coxson, APRN - CNM        docusate sodium (COLACE) capsule 100 mg  100 mg Oral BID PRN Roylene Simmering Coxson, APRN - CNM        penicillin G potassium 2.5 million units in 0.9% sodium chloride 100 mL IVPB  2.5 Million Units IntraVENous Q4H Roylene Simmering Coxson, APRN - CNM        acetaminophen (TYLENOL) tablet 1,000 mg  1,000 mg Oral Q6H PRN Roylene Simmering Coxson, APRN - CNM        mineral oil light external liquid   Topical Once Pinkie Lesches, APRN - CNM        calcium carbonate (TUMS) chewable tablet 500 mg  500 mg Oral TID PRN Roylene Simmering Coxson, APRN - CNM        fentaNYL 2 mcg/mL and ropivacaine 0.1% in sodium chloride 0.9% 100 mL (OB) epidural  12 mL/hr Epidural Continuous Narciso Halim May-Such, APRN - CRNA 12 mL/hr at 03/05/23 1746 12 mL/hr at 03/05/23 1746    naloxone 0.4 mg in 10 mL sodium chloride syringe   IntraVENous PRN Megan A May-Such, APRN - CRNA        ondansetron [facility]) injection 4 mg  4 mg IntraVENous Q6H PRN Narciso Halim May-Such, APRN - CRNA   4 mg at 03/05/23 1712    fentaNYL 2 mcg/mL and ropivacaine 0.1% in sodium chloride 0.9% 100 mL (OB) epidural                Allergies:  No Known Allergies    Problem List:    Patient Active Problem List   Diagnosis Code    Irregular contractions O47.9    34 weeks gestation of pregnancy Z3A.34    Normal IUP (intrauterine pregnancy) on prenatal ultrasound, third trimester Z34.93       Past Medical History:        Diagnosis Date    Anemia     Gestational diabetes        Past Surgical History:        Procedure Laterality Date    OTHER SURGICAL HISTORY      [place] teeth extraction       Social History:    Social History Tobacco Use    Smoking status: Never    Smokeless tobacco: Never   Substance Use Topics    Alcohol use: Not Currently                                Counseling given: Not Answered      Vital Signs (Current):   Vitals:    03/05/23 1720 03/05/23 1725 03/05/23 1730 03/05/23 1735   BP:   (!) 144/82    Pulse: 93 86 100    Resp:       Temp:       TempSrc:       SpO2: 100% 100% 100% 100%                                              BP Readings from Last 3 Encounters:   03/05/23 (!) 144/82       NPO Status: Time of last liquid consumption: 1300                        Time of last solid consumption: 1300                        Date of last liquid consumption: 03/05/23                        Date of last solid food consumption: 03/05/23    BMI:   Wt Readings from Last 3 Encounters:   No data found for Wt     There is no height or weight on file to calculate BMI.    CBC:   Lab Results   Component Value Date/Time    WBC 11.0 03/05/2023 03:15 PM    RBC 4.49 03/05/2023 03:15 PM    HGB 14.7 03/05/2023 03:15 PM    HCT 43.2 03/05/2023 03:15 PM    MCV 96.2 03/05/2023 03:15 PM    RDW 12.8 03/05/2023 03:15 PM     03/05/2023 03:15 PM       CMP:   Lab Results   Component Value Date/Time     01/18/2023 12:20 PM    K 4.0 01/18/2023 12:20 PM     01/18/2023 12:20 PM    CO2 26 01/18/2023 12:20 PM    BUN 8 01/18/2023 12:20 PM    CREATININE 0.48 01/18/2023 12:20 PM    AGRATIO 0.8 01/18/2023 12:20 PM    GLUCOSE 93 03/05/2023 03:15 PM    PROT 6.8 01/18/2023 12:20 PM    CALCIUM 8.7 01/18/2023 12:20 PM    BILITOT 0.3 01/18/2023 12:20 PM    ALKPHOS 89 01/18/2023 12:20 PM    AST 15 01/18/2023 12:20 PM    ALT 21 01/18/2023 12:20 PM       POC Tests: No results for input(s): POCGLU, POCNA, POCK, POCCL, POCBUN, POCHEMO, POCHCT in the last 72 hours.     Coags: No results found for: PROTIME, INR, APTT    HCG (If Applicable): No results found for: PREGTESTUR, PREGSERUM, HCG, HCGQUANT     ABGs: No results found for: PHART, PO2ART, UVH6IMS, FTP5MSI, BEART, S0QUXNZN     Type & Screen (If Applicable):  No results found for: LABABO, LABRH    Drug/Infectious Status (If Applicable):  No results found for: HIV, HEPCAB    COVID-19 Screening (If Applicable): No results found for: COVID19        Anesthesia Evaluation  Patient summary reviewed and Nursing notes reviewed  Airway: Mallampati: II          Dental: normal exam         Pulmonary:Negative Pulmonary ROS breath sounds clear to auscultation                             Cardiovascular:Negative CV ROS  Exercise tolerance: poor (<4 METS),           Rhythm: regular  Rate: normal                    Neuro/Psych:   Negative Neuro/Psych ROS     (-) headaches           GI/Hepatic/Renal:             Endo/Other:    (+) Diabetes, . Abdominal:             Vascular: negative vascular ROS. Other Findings:           Anesthesia Plan      epidural     ASA 2             Anesthetic plan and risks discussed with patient and spouse.               Post-op pain plan if not by surgeon: continuous epidural and epidural opioid            Chad Aviles-Salinas, APRN - CRNA   3/5/2023

## 2023-03-05 NOTE — PROGRESS NOTES
25 Pt presents to L&D for planned induction of labor. Pt is aniceto at this time. EFM and [place] applied. Pt oriented to room. CNM aware of arrival.     1710 Anesthesia at bedside    1720: In position for epidural    1722: Epidural time out    1728 Pt cried out and reports nerve pain, lower left side. 1736: Epidural catheter placed    1719: Handed Fentanyl 100mcg/ml to CLEOPATRA Skinner CRNA to admin in epidural    1737: Epidural test dose    1737: Epidural loading dose admin    1745:  To left tilt

## 2023-03-05 NOTE — PROGRESS NOTES
Labor Progress Note    Patient seen, fetal heart rate and contraction pattern evaluated, patient examined. No data found.     Physical Exam:  Cervical Exam:  5 cm dilated    100% effaced    0 station    Membranes:  Intact  Vtx on SVE and Leopold's    Uterine Activity: Frequency: Every 2-4 minutes  Fetal Heart Rate: Baseline: 140 per minute  Variability: moderate  Accelerations: yes  Decelerations: none  Cat I FHR tracing      Assessment/Plan:  Reassuring fetal status, Labor  Progressing normally  Continue expectant management, Continue plan for vaginal delivery    Lelo Nunez CNM   March 5, 2023

## 2023-03-05 NOTE — ANESTHESIA PROCEDURE NOTES
Epidural Block    Patient location during procedure: OB  Start time: 3/5/2023 5:23 PM  End time: 3/5/2023 5:38 PM  Reason for block: labor epidural  Staffing  Performed: resident/CRNA   Resident/CRNA: SHELTON Chavez - ROSA  Epidural  Patient position: sitting  Prep: ChloraPrep and site prepped and draped  Patient monitoring: continuous pulse ox and frequent blood pressure checks  Approach: midline  Location: L3-4  Injection technique: SHERI saline  Guidance: ultrasound guided  Provider prep: mask and sterile gloves  Needle  Needle type: Tuohy   Needle gauge: 17 G  Epidural needle length (in): 9 cm.   Needle insertion depth: 7 cm  Catheter type: end hole  Catheter size: 19 G  Catheter at skin depth: 12 cm  Test dose: negativeCatheter Secured: tegaderm and tape  Assessment  Hemodynamics: stable  Attempts: 2  Outcomes: patient tolerated procedure well  Preanesthetic Checklist  Completed: patient identified, IV checked, site marked, risks and benefits discussed, surgical/procedural consents, equipment checked, pre-op evaluation, timeout performed, anesthesia consent given, oxygen available, monitors applied/VS acknowledged, fire risk safety assessment completed and verbalized and blood product R/B/A discussed and consented

## 2023-03-06 LAB
ERYTHROCYTE [DISTWIDTH] IN BLOOD BY AUTOMATED COUNT: 12.9 % (ref 11.6–14.5)
HCT VFR BLD AUTO: 36.4 % (ref 35–45)
HGB BLD-MCNC: 12.1 G/DL (ref 12–16)
MCH RBC QN AUTO: 32.5 PG (ref 24–34)
MCHC RBC AUTO-ENTMCNC: 33.2 G/DL (ref 31–37)
MCV RBC AUTO: 97.8 FL (ref 78–100)
NRBC # BLD: 0 K/UL (ref 0–0.01)
NRBC BLD-RTO: 0 PER 100 WBC
PLATELET # BLD AUTO: 126 K/UL (ref 135–420)
PMV BLD AUTO: 13.3 FL (ref 9.2–11.8)
RBC # BLD AUTO: 3.72 M/UL (ref 4.2–5.3)
WBC # BLD AUTO: 14.9 K/UL (ref 4.6–13.2)

## 2023-03-06 PROCEDURE — 6370000000 HC RX 637 (ALT 250 FOR IP)

## 2023-03-06 PROCEDURE — 36415 COLL VENOUS BLD VENIPUNCTURE: CPT

## 2023-03-06 PROCEDURE — 1100000000 HC RM PRIVATE

## 2023-03-06 PROCEDURE — 85027 COMPLETE CBC AUTOMATED: CPT

## 2023-03-06 RX ORDER — IBUPROFEN 800 MG/1
800 TABLET ORAL EVERY 8 HOURS PRN
Qty: 120 TABLET | Refills: 3 | Status: SHIPPED | OUTPATIENT
Start: 2023-03-06

## 2023-03-06 RX ADMIN — DOCUSATE SODIUM 100 MG: 100 CAPSULE ORAL at 08:31

## 2023-03-06 RX ADMIN — IBUPROFEN 800 MG: 400 TABLET, FILM COATED ORAL at 08:17

## 2023-03-06 RX ADMIN — IBUPROFEN 800 MG: 400 TABLET, FILM COATED ORAL at 20:59

## 2023-03-06 ASSESSMENT — PAIN SCALES - GENERAL
PAINLEVEL_OUTOF10: 5
PAINLEVEL_OUTOF10: 3
PAINLEVEL_OUTOF10: 0

## 2023-03-06 ASSESSMENT — PAIN DESCRIPTION - DESCRIPTORS: DESCRIPTORS: ACHING;CRAMPING

## 2023-03-06 ASSESSMENT — PAIN DESCRIPTION - ORIENTATION: ORIENTATION: LOWER

## 2023-03-06 ASSESSMENT — PAIN DESCRIPTION - LOCATION: LOCATION: ABDOMEN;BACK

## 2023-03-06 NOTE — PROGRESS NOTES
Labor Progress Note    Patient seen, fetal heart rate and contraction pattern evaluated, patient examined. No data found. Physical Exam:  Cervical Exam:  6 cm dilated    90% effaced    0 station    Membranes:  Artificial Rupture of Membranes;  Amniotic Fluid: medium amount of clear fluid  Uterine Activity: Frequency: Every 2-7 minutes  Fetal Heart Rate: Baseline: 140 per minute  Variability: moderate  Accelerations: yes  Decelerations: variable  Cat II FHR tracing      Assessment/Plan:  Reassuring fetal status, Labor  Progressing normally  Continue expectant management, Continue plan for vaginal delivery    Ethel SCHULZ CNM   March 5, 2023

## 2023-03-06 NOTE — PLAN OF CARE
Problem: Postpartum  Goal: Experiences normal postpartum course  Description:  Postpartum OB-Pregnancy care plan goal which identifies if the mother is experiencing a normal postpartum course  3/6/2023 0111 by Linnea Galicia RN  Outcome: Progressing  Flowsheets (Taken 3/6/2023 0010)  Experiences Normal Postpartum Course: Monitor maternal vital signs  3/5/2023 2236 by Sher St RN  Outcome: Progressing  Flowsheets  Taken 3/5/2023 191 by Sher St RN  Experiences Normal Postpartum Course: Monitor maternal vital signs  Taken 3/5/2023 184 by Warner La RN  Experiences Normal Postpartum Course:   Monitor maternal vital signs   Assess uterine involution  3/5/2023 180 by Warner La RN  Outcome: Progressing  Goal: Appropriate maternal -  bonding  Description:  Postpartum OB-Pregnancy care plan goal which identifies if the mother and  are bonding appropriately  3/6/2023 0111 by Linnea Galicia RN  Outcome: Progressing  3/5/2023 2236 by Sher St RN  Outcome: Progressing  3/5/2023 180 by Warner La RN  Outcome: Progressing  Goal: Establishment of infant feeding pattern  Description:  Postpartum OB-Pregnancy care plan goal which identifies if the mother is establishing a feeding pattern with their   3/6/2023 0111 by Linnea Galicia RN  Outcome: Progressing  Flowsheets (Taken 3/6/2023 0010)  Establishment of Infant Feeding Pattern: Assess breast/bottle feeding  3/5/2023 2236 by Sher St RN  Outcome: Progressing  Flowsheets  Taken 3/5/2023 1915 by Sher St RN  Establishment of Infant Feeding Pattern: Assess breast/bottle feeding  Taken 3/5/2023 184 by Warner La RN  Establishment of Infant Feeding Pattern: Refer to lactation as needed  3/5/2023 180 by Warner La RN  Outcome: Progressing  Goal: Incisions, wounds, or drain sites healing without S/S of infection  3/6/2023 0111 by Linnea Galicia RN  Outcome: Progressing  Flowsheets (Taken 3/6/2023 0010)  Incisions, Wounds, or Drain Sites Healing Without Sign and Symptoms of Infection: ADMISSION and DAILY: Assess and document risk factors for pressure ulcer development  3/5/2023 2236 by Kostas Garcia RN  Outcome: Progressing  Flowsheets (Taken 3/5/2023 1915)  Incisions, Wounds, or Drain Sites Healing Without Sign and Symptoms of Infection: ADMISSION and DAILY: Assess and document risk factors for pressure ulcer development  3/5/2023 1802 by Bart Westbrook RN  Outcome: Progressing     Problem: Pain  Goal: Verbalizes/displays adequate comfort level or baseline comfort level  3/6/2023 0111 by Timur Odom RN  Outcome: Progressing  Flowsheets (Taken 3/6/2023 0010)  Verbalizes/displays adequate comfort level or baseline comfort level:   Encourage patient to monitor pain and request assistance   Assess pain using appropriate pain scale   Administer analgesics based on type and severity of pain and evaluate response   Implement non-pharmacological measures as appropriate and evaluate response   Consider cultural and social influences on pain and pain management   Notify Licensed Independent Practitioner if interventions unsuccessful or patient reports new pain  3/5/2023 2236 by Kostas Garcia RN  Outcome: Progressing  Flowsheets  Taken 3/5/2023 2200  Verbalizes/displays adequate comfort level or baseline comfort level:   Encourage patient to monitor pain and request assistance   Assess pain using appropriate pain scale  Taken 3/5/2023 1915  Verbalizes/displays adequate comfort level or baseline comfort level:   Assess pain using appropriate pain scale   Encourage patient to monitor pain and request assistance  3/5/2023 1802 by Bart Westbrook RN  Outcome: Progressing  Flowsheets  Taken 3/5/2023 1715  Verbalizes/displays adequate comfort level or baseline comfort level: Administer analgesics based on type and severity of pain and evaluate response  Taken 3/5/2023 1600  Verbalizes/displays adequate comfort level or baseline comfort level:   Encourage patient to monitor pain and request assistance   Assess pain using appropriate pain scale     Problem: Infection - Adult  Goal: Absence of infection at discharge  3/6/2023 0111 by Todd Carrillo RN  Outcome: Progressing  3/5/2023 2236 by Bessie Krishnan RN  Outcome: Progressing  Flowsheets (Taken 3/5/2023 1915)  Absence of infection at discharge:   Assess and monitor for signs and symptoms of infection   Monitor all insertion sites i.e., indwelling lines, tubes and drains   Instruct and encourage patient and family to use good hand hygiene technique  3/5/2023 1802 by Andrew Flood RN  Outcome: Progressing  Goal: Absence of infection during hospitalization  3/6/2023 0111 by Todd Carrillo RN  Outcome: Progressing  3/5/2023 2236 by Bessie Krishnan RN  Outcome: Progressing  Flowsheets (Taken 3/5/2023 1915)  Absence of infection during hospitalization:   Assess and monitor for signs and symptoms of infection   Instruct and encourage patient and family to use good hand hygiene technique   Monitor all insertion sites i.e., indwelling lines, tubes and drains  3/5/2023 1802 by Andrew Flood RN  Outcome: Progressing  Goal: Absence of fever/infection during anticipated neutropenic period  3/6/2023 0111 by Todd Carrillo RN  Outcome: Progressing  3/5/2023 2236 by Bessie Krishnan RN  Outcome: Progressing  3/5/2023 1802 by Andrew Flood RN  Outcome: Progressing     Problem: Safety - Adult  Goal: Free from fall injury  3/6/2023 0111 by Todd Carrillo RN  Outcome: Progressing  [address] (Taken 3/6/2023 0010)  Free From Fall Injury: Instruct family/caregiver on patient safety  3/5/2023 2236 by Bessie Krishnan RN  Outcome: Progressing  Flowsheets  Taken 3/5/2023 1915 by Bessie Krishnan RN  Free From Fall Injury: Instruct family/caregiver on patient safety  Taken 3/5/2023 1845 by Andrew Flood RN  Free From Fall Injury: Instruct family/caregiver on patient safety  3/5/2023 1802 by Tiffanie Pena RN  Outcome: Progressing     Problem: Discharge Planning  Goal: Discharge to home or other facility with appropriate resources  3/6/2023 0111 by Lupe Romeo RN  Outcome: Progressing  3/5/2023 1802 by Tiffanie Pena RN  Outcome: Progressing

## 2023-03-06 NOTE — DISCHARGE INSTRUCTIONS
POST DELIVERY DISCHARGE INSTRUCTIONS    Name: Kar Bee  YOB: 2000  Primary Diagnosis: [unfilled]    General:     Diet/Diet Restrictions:  Eight 8-ounce glasses of fluid daily (water, juices); avoid excessive caffeine intake. Meals/snacks as desired which are high in fiber and carbohydrates and low in fat and cholesterol. Physical Activity / Restrictions / Safety:     Avoid heavy lifting, no more than the baby alone (not the baby in the car seat). Avoid intercourse until you are seen at your postpartum visit. No douching or tampon use. Check with obstetrician before starting or resuming an exercise program.         Discharge Instructions/Special Treatment/Home Care Needs:     Continue prenatal vitamins. Continue to use squirt bottle with warm water on your perineum after each bathroom use until bleeding stops. Call your doctor for the following:     Fever over 101 degrees by mouth. Vaginal bleeding that soaks two pads per hour for more than one hour. Red streaks or increased swelling of legs, painful red streaks on your breast.  If you feel extremely anxious or overwhelmed. If you have thoughts of harming yourself and/or your baby. Pain Management:     Pain Management:   Take Acetaminophen (Tylenol) or Ibuprofen (Advil, Motrin), as directed for pain. Use a warm Sitz bath 3 times daily to relieve perineal or hemorrhoidal discomfort. For hemorrhoidal discomfort, use Tucks and Anusol cream as needed and directed.     Follow-Up Care:     Appointment with MD:   Sathish Fraser  Telephone number: [phone]      Signed By: SHELTON Yen CNM

## 2023-03-06 NOTE — DISCHARGE SUMMARY
Name: Camilo Villeda MRN: [id number]  SSN: [id number]    YOB: 2000  Age: 20 y.o. Sex: female      Allergies: Patient has no known allergies. Admit Date: 3/5/2023    Discharge Date: 3/6/2023     Admitting Physician: Dayton Ring MD     Attending Physician:  Billy Cochran MD     * Admission Diagnoses: Normal IUP (intrauterine pregnancy) on prenatal ultrasound, third trimester [Z34.93]    * Discharge Diagnoses:   Information for the patient's :  Ander Cross Girl Bandar Alva [[id number]]   @[id number]@      Additional Diagnoses:    Lab Results   Component Value Date/Time    ABORH O POSITIVE 2023 03:15 PM    There is no immunization history for the selected administration types on file for this patient. * Procedures:   * No surgery found *           * Discharge Condition: [facility] Course: Normal hospital course following the delivery. * Disposition: Home    Discharge Medications:      Medication List        START taking these medications      ibuprofen 800 MG tablet  Commonly known as: ADVIL;MOTRIN  Take 1 tablet by mouth every 8 hours as needed for Pain               Where to Get Your Medications        These medications were sent to [address] [place] [address] [place] [phone]  [place] [address]      Phone: [phone]   ibuprofen 800 MG tablet         * Follow-up Care/Patient Instructions:   Activity: activity as tolerated  Diet: regular diet  Wound Care: keep wound clean and dry  Follow up with [facility] in 6 weeks and PRN

## 2023-03-06 NOTE — PLAN OF CARE
Problem: Postpartum  Goal: Experiences normal postpartum course  Description:  Postpartum OB-Pregnancy care plan goal which identifies if the mother is experiencing a normal postpartum course  3/6/2023 0847 by Herbie Krabbe, RN  Outcome: Progressing  Flowsheets (Taken 3/6/2023 0815)  Experiences Normal Postpartum Course:   Monitor maternal vital signs   Assess uterine involution     Problem: Postpartum  Goal: Appropriate maternal -  bonding  Description:  Postpartum OB-Pregnancy care plan goal which identifies if the mother and  are bonding appropriately  3/6/2023 0847 by Herbie Krabbe, RN  Outcome: Progressing     Problem: Postpartum  Goal: Establishment of infant feeding pattern  Description:  Postpartum OB-Pregnancy care plan goal which identifies if the mother is establishing a feeding pattern with their   3/6/2023 0847 by Herbie Krabbe, RN  Outcome: Progressing  Flowsheets (Taken 3/6/2023 0815)  Establishment of Infant Feeding Pattern:   Assess breast/bottle feeding   Refer to lactation as needed     Problem: Postpartum  Goal: Incisions, wounds, or drain sites healing without S/S of infection  3/6/2023 0847 by Herbie Krabbe, RN  Outcome: Progressing     Problem: Pain  Goal: Verbalizes/displays adequate comfort level or baseline comfort level  3/6/2023 0847 by Herbie Krabbe, RN  Outcome: Progressing  Flowsheets (Taken 3/6/2023 0815)  Verbalizes/displays adequate comfort level or baseline comfort level:   Encourage patient to monitor pain and request assistance   Assess pain using appropriate pain scale   Administer analgesics based on type and severity of pain and evaluate response   Implement non-pharmacological measures as appropriate and evaluate response   Notify Licensed Independent Practitioner if interventions unsuccessful or patient reports new pain     Problem: Infection - Adult  Goal: Absence of infection at discharge  3/6/2023 0847 by Herbie Krabbe, RN  Outcome: Progressing  Flowsheets (Taken 3/6/2023 0815)  Absence of infection at discharge:   Assess and monitor for signs and symptoms of infection   Monitor lab/diagnostic results   Monitor all insertion sites i.e., indwelling lines, tubes and drains   Instruct and encourage patient and family to use good hand hygiene technique     Problem: Infection - Adult  Goal: Absence of infection during hospitalization  3/6/2023 0847 by Rio Meyer RN  Outcome: Progressing     Problem: Infection - Adult  Goal: Absence of fever/infection during anticipated neutropenic period  3/6/2023 0847 by Rio Meyer RN  Outcome: Progressing     Problem: Safety - Adult  Goal: Free from fall injury  3/6/2023 0847 by Rio Meyer RN  Outcome: Progressing  Flowsheets (Taken 3/6/2023 0815)  Free From Fall Injury:   Instruct family/caregiver on patient safety   Based on caregiver fall risk screen, instruct family/caregiver to ask for assistance with transferring infant if caregiver noted to have fall risk factors     Problem: Discharge Planning  Goal: Discharge to home or other facility with appropriate resources  3/6/2023 0847 by Rio Meyer RN  Outcome: Progressing

## 2023-03-06 NOTE — L&D DELIVERY NOTE
Mother's Information      Labor Events     Labor?: No  Cervical Ripening:   Now               Lucina Romero Girl Stefani Benjamin [[id number]]      Labor Events     Labor?: No   Steroids?: None  Cervical Ripening Date/Time:     Antibiotics Received during Labor?: Yes  Rupture Identifier: Sac 1   Rupture Date/Time: 3/5/23 18:45:00   Rupture Type: AROM  Fluid Color: Clear  Fluid Odor: None  Fluid Volume: Moderate  Induction: None  Augmentation: AROM  Labor Complications: None       Anesthesia    Method: Epidural       Start Pushing      Labor onset date/time: 3/5/23 16:40:00 EST Now     Dilation complete date/time: 3/5/23 20:40:00 EST Now     Start pushing date/time: 3/5/2023 20:43:00   Decision date/time (emergent ):           Delivery ()      Delivery Date/Time:  3/5/23 21:30:00   Delivery Type: Vaginal, Spontaneous    Details:             Presentation    Presentation: Compound  _: Occiput  _: Anterior       Shoulder Dystocia    Shoulder Dystocia Present?: No  Add Second Maneuver  Add Third Maneuver  Add Fourth Maneuver  Add Fifth Maneuver  Add Sixth Maneuver  Add Seventh Maneuver  Add Eighth Maneuver  Add Ninth Maneuver       Assisted Delivery Details    Forceps Attempted?: No  Vacuum Extractor Attempted?: No       Document Additional Attempt         Document Additional Attempt                 Cord    Vessels: 3 Vessels  Complications: None  Delayed Cord Clamping?: Yes  Cord Clamped Date/Time: 3/5/2023 21:33:00  Cord Blood Disposition: Lab  Gases Sent?: No       Placenta    Date/Time: 3/5/2023 21:35:58  Removal: Expressed  Appearance: Intact  Disposition: Discarded       Lacerations    Episiotomy: None  Perineal Lacerations: None  Other Lacerations: labial laceration  Labial Laceration: bilateral Repaired?: Yes   Number of Repair Packets: 2       Vaginal Counts    Initial Count Personnel: CLEOPATRA FONTANEZ CNM  Initial Count Verified By: ANA ROTHMANRN    Sponges Media Instruments   Initial Counts Correct     Final Counts Correct     Final Count Personnel: Ariel Yepez  Final Count Verified By: ANA ROTHMAN,RN  Accurate Final Count?: Yes  If the count is incorrect due to Intentionally Retained Foreign Object (IRFO) add the IRFO LDA in Lines/Drains. Add LDA: Link to [facility]       Blood Loss  Mother: Constanza Hobson #[id number]     Start of Mother's Information      Delivery Blood Loss  23 1640 - 23 2210      None                 End of Mother's Information  Mother: Constanza Hobson #[id number]                Delivery Providers    Delivering clinician: SHELTON Rodgers CNM     Provider Role     Obstetrician    Damien Adame, RN Primary Nurse    Tracey Zamarripa RN Primary [place] Nurse     NICU Nurse     Neonatologist     Anesthesiologist     Nurse Anesthetist     Nurse Practitioner    SHELTON Rodgers CNM Midwife     Nursery Nurse               Assessment    Living Status: Living  Delivery Location Comment: ROOM 7     Apgar Scoring Key:    0 1 2    Skin Color: Blue or pale Acrocyanotic Completely pink    Heart Rate: Absent <100 bpm >100 bpm    Reflex Irritability: No response Grimace Cry or active withdrawal    Muscle Tone: Limp Some flexion Active motion    Respiratory Effort: Absent Weak cry; hypoventilation Good, crying                      Skin Color:   Heart Rate:   Reflex Irritability:   Muscle Tone:   Respiratory Effort:    Total:            1 Minute:        Apgar 1 total from OB History    5 Minute:        Apgar 5 total from OB History    10 Minute:              15 Minute:              20 Minute:                                 Resuscitation    Method: Stimulation             [place] Measurements               Title      Skin to Skin Initiation Date/Time: 3/5/23 21:34:00 EST     Skin to Skin With: Mother     Skin to Skin End Date/Time:                    Vaginal Delivery Procedure Note    Name: Graciela Harrison   Medical Record Number: [id number]   Date of Birth: 2000  Today's Date: 2023    Procedure: VAGINAL DELIVERY     Anesthesia: yes       Type - 1% xylocaine local:yes  Epidural: yes    Extra Procedure Details:       Estimated Blood Loss: 250 ccs    Fetal Description:  female     Anterior shoulder: left    Episiotomy: no   Tear: yes  Degree: bilateral labial degree              Cord Blood Results:   Information for the patient's :  Justice Hodgson [[id number]]   No components found for: ABG       Apgars:     Birth Information:   Information for the patient's :  Karla Spurr Girl Emmanuel Se [[id number]]         Placenta: delivered spontaneously at 2135, appears intact, 3 vessel cord    Specimens: Placenta  was not  sent           Complications:  none     Birth Weight: see baby chart    Mother's Condition: Good  Baby's Condition: Good    Pt feeling urge to push and was found to be 10/100/+1. Pt started pushing adequately with coaching. Fetal head crowned and head delivered shortly after in OA position with a compound right hand which then restituted to PATRICIO. No nuchal cord was noted and the anterior shoulder followed without difficulty, baby was born at 2130 and placed on the maternal abdomen skin to skin. Cord was clamped after 3 mins when pulsation ceased and cut by the FOB. Cord blood was collected. Placenta was delivered intact in Sunshine gerard position at  2135. Fundus was firm at U-1. Cytotec 800 mcg OH placed for active third stage management. Perineum was inspected and a bilateral labial tear  was noted which was repaired with 3.0 Vicryl suture and was then hemostatic.  Counts correct x 3 . Placenta noted to intact with a 3VC and sent for disposal.  Mom and baby stable and bonding. I was present for the delivery.     Signed: SHELTON Shelby CNM      2023

## 2023-03-06 NOTE — PROGRESS NOTES
Progress Note    Patient: Barrera Marroquin MRN: [id number]     YOB: 2000  Age: 20 y.o. Subjective:     Postpartum Day: 1    The patient is feeling well. Pain is  well controlled with current medications. Urinary output is adequate. Baby is feeding via breast without difficulty, using nipple shield. Feedings last approx 20mins and lactation to bedside for assistance today. Reviewed hygiene/peribottle use regarding bilateral labial lacerations; no questions and Francisco Mckeon does understand to use with all voids. Objective:      Patient Vitals for the past 12 hrs:   Temp Pulse Resp BP SpO2   03/06/23 0750 97.9 °F (36.6 °C) 81 16 121/87 100 %   03/06/23 0510 98.4 °F (36.9 °C) 96 16 138/87 98 %   03/06/23 0010 97.7 °F (36.5 °C) (!) 104 18 130/80 98 %   03/05/23 2345 -- (!) 109 -- 136/82 --   03/05/23 2330 -- (!) 113 -- (!) 140/89 --   03/05/23 2315 -- (!) 109 -- 135/76 --   03/05/23 2300 -- (!) 110 -- 139/79 --   03/05/23 2245 -- (!) 105 -- (!) 121/96 --   03/05/23 2230 -- (!) 109 -- (!) 143/91 --   03/05/23 2215 -- (!) 126 -- (!) 140/84 --   03/05/23 2200 98.5 °F (36.9 °C) (!) 124 18 (!) 81/59 100 %   03/05/23 2152 -- (!) 111 -- (!) 150/77 --       General:    alert, cooperative, no distress   Lochia:  appropriate   Uterine Fundus:   firm @ umbilicus    Perineum:  well-approximated   DVT Evaluation:  No evidence of DVT seen on physical exam.  No significant calf/ankle edema.      Lab/Data Review:  Recent Results (from the past 24 hour(s))   TYPE AND SCREEN    Collection Time: 03/05/23  3:15 PM   Result Value Ref Range    Crossmatch expiration date 03/08/2023,5384     ABO/Rh O POSITIVE     Antibody Screen NEG    Glucose, Random    Collection Time: 03/05/23  3:15 PM   Result Value Ref Range    Glucose 93 74 - 99 mg/dL   CBC with Auto Differential    Collection Time: 03/05/23  3:15 PM   Result Value Ref Range    WBC 11.0 4.6 - 13.2 K/uL    RBC 4.49 4.20 - 5.30 M/uL    Hemoglobin 14.7 12.0 - 16.0 g/dL Hematocrit 43.2 35.0 - 45.0 %    MCV 96.2 78.0 - 100.0 FL    MCH 32.7 24.0 - 34.0 PG    MCHC 34.0 31.0 - 37.0 g/dL    RDW 12.8 11.6 - 14.5 %    Platelets 012 145 - 128 K/uL    MPV 13.3 (H) 9.2 - 11.8 FL    Nucleated RBCs 0.0 0  WBC    nRBC 0.00 0.00 - 0.01 K/uL    Seg Neutrophils 78 (H) 40 - 73 %    Lymphocytes 15 (L) 21 - 52 %    Monocytes 5 3 - 10 %    Eosinophils % 1 0 - 5 %    Basophils 0 0 - 2 %    Immature Granulocytes 1 (H) 0.0 - 0.5 %    Segs Absolute 8.6 (H) 1.8 - 8.0 K/UL    Absolute Lymph # 1.7 0.9 - 3.6 K/UL    Absolute Mono # 0.6 0.05 - 1.2 K/UL    Absolute Eos # 0.1 0.0 - 0.4 K/UL    Basophils Absolute 0.0 0.0 - 0.1 K/UL    Absolute Immature Granulocyte 0.1 (H) 0.00 - 0.04 K/UL    Differential Type AUTOMATED     CBC    Collection Time: 03/06/23  8:15 AM   Result Value Ref Range    WBC 14.9 (H) 4.6 - 13.2 K/uL    RBC 3.72 (L) 4.20 - 5.30 M/uL    Hemoglobin 12.1 12.0 - 16.0 g/dL    Hematocrit 36.4 35.0 - 45.0 %    MCV 97.8 78.0 - 100.0 FL    MCH 32.5 24.0 - 34.0 PG    MCHC 33.2 31.0 - 37.0 g/dL    RDW 12.9 11.6 - 14.5 %    Platelets 320 (L) 531 - 420 K/uL    MPV 13.3 (H) 9.2 - 11.8 FL    Nucleated RBCs 0.0 0  WBC    nRBC 0.00 0.00 - 0.01 K/uL     Labs reviewed    Assessment:     Delivery: spontaneous vaginal delivery    Plan:     Doing well postpartum vaginal delivery. Continue current postpartum care with lactation support. Encouraged hydration, nutrition and ambulation. Deb Linares has no questions at this time. Plan DC home tomorrow.     Signed By: Judah PINEDA    March 6, 2023

## 2023-03-07 VITALS
SYSTOLIC BLOOD PRESSURE: 131 MMHG | DIASTOLIC BLOOD PRESSURE: 85 MMHG | OXYGEN SATURATION: 100 % | WEIGHT: 194 LBS | HEART RATE: 92 BPM | BODY MASS INDEX: 30.45 KG/M2 | HEIGHT: 67 IN | TEMPERATURE: 97.7 F | RESPIRATION RATE: 17 BRPM

## 2023-03-07 PROCEDURE — 6370000000 HC RX 637 (ALT 250 FOR IP)

## 2023-03-07 RX ADMIN — IBUPROFEN 800 MG: 400 TABLET, FILM COATED ORAL at 06:25

## 2023-03-07 ASSESSMENT — PAIN DESCRIPTION - DESCRIPTORS: DESCRIPTORS: ACHING;CRAMPING

## 2023-03-07 ASSESSMENT — PAIN DESCRIPTION - LOCATION: LOCATION: ABDOMEN;BACK

## 2023-03-07 ASSESSMENT — PAIN DESCRIPTION - ORIENTATION: ORIENTATION: LOWER

## 2023-03-07 ASSESSMENT — PAIN - FUNCTIONAL ASSESSMENT: PAIN_FUNCTIONAL_ASSESSMENT: ACTIVITIES ARE NOT PREVENTED

## 2023-03-07 ASSESSMENT — PAIN SCALES - GENERAL: PAINLEVEL_OUTOF10: 5

## 2023-03-07 NOTE — ANESTHESIA POSTPROCEDURE EVALUATION
Department of Anesthesiology  Postprocedure Note    Patient: Marnie Nam  MRN: [id number]  YOB: 2000  Date of evaluation: 3/7/2023      Procedure Summary     Date: 03/05/23 Room / Location:     Anesthesia Start: 1711 Anesthesia Stop: 2130    Procedure: Labor Analgesia Diagnosis: IUP (intrauterine pregnancy), incidental    Scheduled Providers:  Responsible Provider:     Anesthesia Type: Epidural ASA Status: 2          Anesthesia Type: Epidural    Kanika Phase I:      Kanika Phase II:        Anesthesia Post Evaluation

## 2023-03-07 NOTE — PLAN OF CARE
Problem: Postpartum  Goal: Experiences normal postpartum course  Description:  Postpartum OB-Pregnancy care plan goal which identifies if the mother is experiencing a normal postpartum course  Outcome: Progressing  Goal: Appropriate maternal -  bonding  Description:  Postpartum OB-Pregnancy care plan goal which identifies if the mother and  are bonding appropriately  Outcome: Progressing  Goal: Establishment of infant feeding pattern  Description:  Postpartum OB-Pregnancy care plan goal which identifies if the mother is establishing a feeding pattern with their   Outcome: Progressing  Goal: Incisions, wounds, or drain sites healing without S/S of infection  Outcome: Progressing     Problem: Pain  Goal: Verbalizes/displays adequate comfort level or baseline comfort level  Outcome: Progressing     Problem: Infection - Adult  Goal: Absence of infection at discharge  Outcome: Progressing  Goal: Absence of infection during hospitalization  Outcome: Progressing  Goal: Absence of fever/infection during anticipated neutropenic period  Outcome: Progressing     Problem: Safety - Adult  Goal: Free from fall injury  Outcome: Progressing     Problem: Discharge Planning  Goal: Discharge to home or other facility with appropriate resources  Outcome: Progressing     Problem: Skin/Tissue Integrity  Goal: Absence of new skin breakdown  Description: 1. Monitor for areas of redness and/or skin breakdown  2. Assess vascular access sites hourly  3. Every 4-6 hours minimum:  Change oxygen saturation probe site  4. Every 4-6 hours:  If on nasal continuous positive airway pressure, respiratory therapy assess nares and determine need for appliance change or resting period.   Outcome: Progressing

## 2025-03-19 NOTE — PLAN OF CARE
Please advise patient that after reviewing her recent lab work I would recommend starting Jardiance 10 mg daily. We had discussed starting a medication that could help with her CKD and also new -onset of DM during our last OV.    Since her CKD is also gradually getting worse and she is not on an ACEi/ARB (allergy), I would recommend starting Jardiance 10 mg. It will also help with DM.    Please review medication with patient.     Repeat BMP in 2 weeks after starting it.    Problem: Postpartum  Goal: Experiences normal postpartum course  Description:  Postpartum OB-Pregnancy care plan goal which identifies if the mother is experiencing a normal postpartum course  3/5/2023 2236 by Arik Pruitt RN  Outcome: Progressing  Flowsheets  Taken 3/5/2023 1915 by Arik Pruitt RN  Experiences Normal Postpartum Course: Monitor maternal vital signs  Taken 3/5/2023 1845 by Arias Cox RN  Experiences Normal Postpartum Course:   Monitor maternal vital signs   Assess uterine involution  3/5/2023 180 by Arias Cox RN  Outcome: Progressing  Goal: Appropriate maternal -  bonding  Description:  Postpartum OB-Pregnancy care plan goal which identifies if the mother and  are bonding appropriately  3/5/2023 2236 by Arik Pruitt RN  Outcome: Progressing  3/5/2023 180 by Arias Cox RN  Outcome: Progressing  Goal: Establishment of infant feeding pattern  Description:  Postpartum OB-Pregnancy care plan goal which identifies if the mother is establishing a feeding pattern with their   3/5/2023 2236 by Arik Pruitt RN  Outcome: Progressing  Flowsheets  Taken 3/5/2023 1915 by Arik Pruitt RN  Establishment of Infant Feeding Pattern: Assess breast/bottle feeding  Taken 3/5/2023 184 by Arias Cox RN  Establishment of Infant Feeding Pattern: Refer to lactation as needed  3/5/2023 180 by Arias Cox RN  Outcome: Progressing  Goal: Incisions, wounds, or drain sites healing without S/S of infection  3/5/2023 2236 by Arik Pruitt RN  Outcome: Progressing  Flowsheets (Taken 3/5/2023 1915)  Incisions, Wounds, or Drain Sites Healing Without Sign and Symptoms of Infection: ADMISSION and DAILY: Assess and document risk factors for pressure ulcer development  3/5/2023 1802 by Arias Cox RN  Outcome: Progressing     Problem: Pain  Goal: Verbalizes/displays adequate comfort level or baseline comfort level  3/5/2023 2236 by Arik Pruitt RN  Outcome: Progressing  Flowsheets  Taken 3/5/2023 2200  Verbalizes/displays adequate comfort level or baseline comfort level:   Encourage patient to monitor pain and request assistance   Assess pain using appropriate pain scale  Taken 3/5/2023 1915  Verbalizes/displays adequate comfort level or baseline comfort level:   Assess pain using appropriate pain scale   Encourage patient to monitor pain and request assistance  3/5/2023 1802 by Ankush Morales RN  Outcome: Progressing  Flowsheets  Taken 3/5/2023 1715  Verbalizes/displays adequate comfort level or baseline comfort level: Administer analgesics based on type and severity of pain and evaluate response  Taken 3/5/2023 1600  Verbalizes/displays adequate comfort level or baseline comfort level:   Encourage patient to monitor pain and request assistance   Assess pain using appropriate pain scale     Problem: Infection - Adult  Goal: Absence of infection at discharge  3/5/2023 2236 by Ronit Duncan RN  Outcome: Progressing  Flowsheets (Taken 3/5/2023 1915)  Absence of infection at discharge:   Assess and monitor for signs and symptoms of infection   Monitor all insertion sites i.e., indwelling lines, tubes and drains   Instruct and encourage patient and family to use good hand hygiene technique  3/5/2023 1802 by Ankush Morales RN  Outcome: Progressing  Goal: Absence of infection during hospitalization  3/5/2023 2236 by Ronit Duncan RN  Outcome: Progressing  Flowsheets (Taken 3/5/2023 1915)  Absence of infection during hospitalization:   Assess and monitor for signs and symptoms of infection   Instruct and encourage patient and family to use good hand hygiene technique   Monitor all insertion sites i.e., indwelling lines, tubes and drains  3/5/2023 1802 by Ankush Morales RN  Outcome: Progressing  Goal: Absence of fever/infection during anticipated neutropenic period  3/5/2023 2236 by Ronit Duncan RN  Outcome: Progressing  3/5/2023 1802 by Ankush Morales RN  Outcome: Progressing     Problem: Safety - Adult  Goal: Free from fall injury  3/5/2023 2236 by Rich Hutchinson RN  Outcome: Progressing  Flowsheets  Taken 3/5/2023 1915 by Rich Hutchinson RN  Free From Fall Injury: Instruct family/caregiver on patient safety  Taken 3/5/2023 1845 by Latrice Wolfe RN  Free From Fall Injury: Instruct family/caregiver on patient safety  3/5/2023 180 by Latrice Wolfe RN  Outcome: Progressing     Problem: Discharge Planning  Goal: Discharge to home or other facility with appropriate resources  3/5/2023 180 by Latrice Wolfe RN  Outcome: Progressing     Problem: Vaginal Birth or  Section  Goal: Fetal and maternal status remain reassuring during the birth process  Description:  Birth OB-Pregnancy care plan goal which identifies if the fetal and maternal status remain reassuring during the birth process  3/5/2023 2236 by Rich Hutchinson RN  Outcome: Completed  Flowsheets  Taken 3/5/2023 1915 by Rich Hutchinson RN  Fetal and Maternal Status Remain Reassuring During the Birth Process:   Monitor fetal heart rate   Monitor vital signs   Monitor uterine activity   Monitor labor progression (Vaginal delivery)  Taken 3/5/2023 1845 by Latrice Wolfe RN  Fetal and Maternal Status Remain Reassuring During the Birth Process:   Monitor vital signs   Monitor fetal heart rate   Monitor uterine activity   Monitor labor progression (Vaginal delivery)  3/5/2023 1802 by Latrice Wolfe RN  Outcome: Progressing